# Patient Record
Sex: FEMALE | Race: WHITE | NOT HISPANIC OR LATINO | Employment: OTHER | URBAN - METROPOLITAN AREA
[De-identification: names, ages, dates, MRNs, and addresses within clinical notes are randomized per-mention and may not be internally consistent; named-entity substitution may affect disease eponyms.]

---

## 2023-03-29 LAB
ALANINE AMINOTRANSFERASE (SGPT) (U/L) IN SER/PLAS: 48 U/L (ref 7–45)
ALBUMIN (G/DL) IN SER/PLAS: 4.3 G/DL (ref 3.4–5)
ALKALINE PHOSPHATASE (U/L) IN SER/PLAS: 52 U/L (ref 33–136)
ASPARTATE AMINOTRANSFERASE (SGOT) (U/L) IN SER/PLAS: 30 U/L (ref 9–39)
BILIRUBIN DIRECT (MG/DL) IN SER/PLAS: 0.2 MG/DL (ref 0–0.3)
BILIRUBIN TOTAL (MG/DL) IN SER/PLAS: 0.7 MG/DL (ref 0–1.2)
C REACTIVE PROTEIN (MG/L) IN SER/PLAS: <0.1 MG/DL
ERYTHROCYTE DISTRIBUTION WIDTH (RATIO) BY AUTOMATED COUNT: 14.2 % (ref 11.5–14.5)
ERYTHROCYTE MEAN CORPUSCULAR HEMOGLOBIN CONCENTRATION (G/DL) BY AUTOMATED: 31.8 G/DL (ref 32–36)
ERYTHROCYTE MEAN CORPUSCULAR VOLUME (FL) BY AUTOMATED COUNT: 93 FL (ref 80–100)
ERYTHROCYTES (10*6/UL) IN BLOOD BY AUTOMATED COUNT: 4.6 X10E12/L (ref 4–5.2)
HEMATOCRIT (%) IN BLOOD BY AUTOMATED COUNT: 42.8 % (ref 36–46)
HEMOGLOBIN (G/DL) IN BLOOD: 13.6 G/DL (ref 12–16)
LEUKOCYTES (10*3/UL) IN BLOOD BY AUTOMATED COUNT: 4.8 X10E9/L (ref 4.4–11.3)
NRBC (PER 100 WBCS) BY AUTOMATED COUNT: 0 /100 WBC (ref 0–0)
PLATELETS (10*3/UL) IN BLOOD AUTOMATED COUNT: 241 X10E9/L (ref 150–450)
PROTEIN TOTAL: 6.4 G/DL (ref 6.4–8.2)
SEDIMENTATION RATE, ERYTHROCYTE: <1 MM/H (ref 0–30)

## 2023-04-01 LAB
NIL(NEG) CONTROL SPOT COUNT: NORMAL
PANEL A SPOT COUNT: 0
PANEL B SPOT COUNT: 0
POS CONTROL SPOT COUNT: NORMAL
T-SPOT. TB INTERPRETATION: NEGATIVE

## 2023-04-17 DIAGNOSIS — I10 PRIMARY HYPERTENSION: ICD-10-CM

## 2023-04-17 NOTE — TELEPHONE ENCOUNTER
Rx Refill Request Telephone Encounter    Name:  Penny WARNER Moe  :  [id number]  Medication Name:        Lisinopril-hydrochlorothiazide 20-12.5 MG Oral Tablet 1 tab taken daily    Leflunomide 20M MG Oral Tablet 1 tab taken daily                  Specific Pharmacy location:   [facility]    Date of last appointment:  2023  Date of next appointment:  2023  Best number to reach patient:  [phone]

## 2023-04-19 RX ORDER — LISINOPRIL AND HYDROCHLOROTHIAZIDE 12.5; 2 MG/1; MG/1
1 TABLET ORAL DAILY
COMMUNITY
Start: 2022-11-21 | End: 2023-04-27 | Stop reason: SDUPTHER

## 2023-04-19 RX ORDER — LEFLUNOMIDE 20 MG/1
1 TABLET ORAL DAILY
COMMUNITY
Start: 2022-03-28 | End: 2023-10-19 | Stop reason: SDUPTHER

## 2023-04-19 NOTE — TELEPHONE ENCOUNTER
Rx pended to Dr Yen for lisinopril. The other medication is from rheumatologist. She will need to contact them.

## 2023-04-21 NOTE — TELEPHONE ENCOUNTER
I was given a sticky note that stated the patient was awaiting for me to sign lisinopril, however it appears the medication was pended to dr. Yen, please forward

## 2023-04-21 NOTE — TELEPHONE ENCOUNTER
Patient called regarding refills, Lisinopril will be sent to [facility] today. The other medication the patient has been advised to call the rheumatologist for that refill.

## 2023-04-26 RX ORDER — LISINOPRIL AND HYDROCHLOROTHIAZIDE 12.5; 2 MG/1; MG/1
1 TABLET ORAL DAILY
Qty: 90 TABLET | Refills: 0 | Status: CANCELLED | OUTPATIENT
Start: 2023-04-26 | End: 2024-04-25

## 2023-04-27 ENCOUNTER — OFFICE VISIT (OUTPATIENT)
Dept: PRIMARY CARE | Facility: CLINIC | Age: 68
End: 2023-04-27
Payer: MEDICARE

## 2023-04-27 VITALS
RESPIRATION RATE: 18 BRPM | OXYGEN SATURATION: 95 % | DIASTOLIC BLOOD PRESSURE: 84 MMHG | TEMPERATURE: 96.1 F | SYSTOLIC BLOOD PRESSURE: 146 MMHG | WEIGHT: 264.2 LBS | HEIGHT: 62 IN | HEART RATE: 67 BPM | BODY MASS INDEX: 48.62 KG/M2

## 2023-04-27 DIAGNOSIS — M05.79 RHEUMATOID ARTHRITIS INVOLVING MULTIPLE SITES WITH POSITIVE RHEUMATOID FACTOR (MULTI): ICD-10-CM

## 2023-04-27 DIAGNOSIS — I10 ESSENTIAL HYPERTENSION: ICD-10-CM

## 2023-04-27 DIAGNOSIS — Z13.21 ENCOUNTER FOR VITAMIN DEFICIENCY SCREENING: ICD-10-CM

## 2023-04-27 DIAGNOSIS — E78.2 MIXED HYPERLIPIDEMIA: ICD-10-CM

## 2023-04-27 DIAGNOSIS — Z13.29 THYROID DISORDER SCREENING: ICD-10-CM

## 2023-04-27 DIAGNOSIS — E83.52 HYPERCALCEMIA: Primary | ICD-10-CM

## 2023-04-27 DIAGNOSIS — E21.0 PRIMARY HYPERPARATHYROIDISM (MULTI): ICD-10-CM

## 2023-04-27 PROBLEM — M19.022 ARTHRITIS OF LEFT ELBOW: Status: ACTIVE | Noted: 2023-04-27

## 2023-04-27 PROBLEM — S89.92XA INJURY OF LEFT KNEE: Status: ACTIVE | Noted: 2023-04-27

## 2023-04-27 PROBLEM — D36.9 ADENOMATOUS POLYPS: Status: ACTIVE | Noted: 2023-04-27

## 2023-04-27 PROBLEM — D12.5 ADENOMATOUS POLYP OF SIGMOID COLON: Status: ACTIVE | Noted: 2023-04-27

## 2023-04-27 PROBLEM — D12.6 TUBULAR ADENOMA OF COLON: Status: ACTIVE | Noted: 2023-04-27

## 2023-04-27 PROBLEM — M54.50 LUMBAGO: Status: ACTIVE | Noted: 2023-04-27

## 2023-04-27 PROBLEM — R35.1 NOCTURIA: Status: ACTIVE | Noted: 2023-04-27

## 2023-04-27 PROBLEM — S86.912A STRAIN OF LEFT KNEE AND LEG: Status: ACTIVE | Noted: 2023-04-27

## 2023-04-27 PROBLEM — S99.921A INJURY OF TOE ON RIGHT FOOT: Status: ACTIVE | Noted: 2023-04-27

## 2023-04-27 PROBLEM — N32.81 OVERACTIVE BLADDER: Status: ACTIVE | Noted: 2023-04-27

## 2023-04-27 PROBLEM — S96.911A RIGHT FOOT STRAIN, INITIAL ENCOUNTER: Status: ACTIVE | Noted: 2023-04-27

## 2023-04-27 PROBLEM — J20.9 ACUTE BRONCHITIS: Status: ACTIVE | Noted: 2023-04-27

## 2023-04-27 PROBLEM — J02.9 SORE THROAT: Status: ACTIVE | Noted: 2023-04-27

## 2023-04-27 PROBLEM — Z96.611 STATUS POST TOTAL REPLACEMENT OF RIGHT SHOULDER: Status: ACTIVE | Noted: 2023-04-27

## 2023-04-27 PROBLEM — D17.23 LIPOMA OF RIGHT LOWER EXTREMITY: Status: ACTIVE | Noted: 2023-04-27

## 2023-04-27 PROBLEM — M19.019 SHOULDER ARTHRITIS: Status: ACTIVE | Noted: 2023-04-27

## 2023-04-27 PROBLEM — M25.473 ANKLE SWELLING: Status: ACTIVE | Noted: 2023-04-27

## 2023-04-27 PROBLEM — M85.89 OSTEOPENIA OF MULTIPLE SITES: Status: ACTIVE | Noted: 2023-04-27

## 2023-04-27 PROBLEM — K52.9 CHRONIC DIARRHEA: Status: ACTIVE | Noted: 2023-04-27

## 2023-04-27 PROBLEM — N31.8 FREQUENCY-URGENCY SYNDROME: Status: ACTIVE | Noted: 2023-04-27

## 2023-04-27 PROBLEM — B37.2 YEAST INFECTION OF THE SKIN: Status: ACTIVE | Noted: 2023-04-27

## 2023-04-27 PROBLEM — F32.A DEPRESSION: Status: ACTIVE | Noted: 2023-04-27

## 2023-04-27 PROBLEM — F33.42 RECURRENT MAJOR DEPRESSIVE DISORDER, IN FULL REMISSION (CMS-HCC): Status: ACTIVE | Noted: 2023-04-27

## 2023-04-27 PROBLEM — M25.511 RIGHT SHOULDER PAIN: Status: ACTIVE | Noted: 2023-04-27

## 2023-04-27 PROBLEM — M25.811 SHOULDER IMPINGEMENT, RIGHT: Status: ACTIVE | Noted: 2023-04-27

## 2023-04-27 PROBLEM — J03.90 TONSILLITIS: Status: ACTIVE | Noted: 2023-04-27

## 2023-04-27 PROBLEM — M06.9 RHEUMATOID ARTHRITIS (MULTI): Status: ACTIVE | Noted: 2023-04-27

## 2023-04-27 PROCEDURE — 1159F MED LIST DOCD IN RCRD: CPT | Performed by: NURSE PRACTITIONER

## 2023-04-27 PROCEDURE — 90750 HZV VACC RECOMBINANT IM: CPT | Performed by: NURSE PRACTITIONER

## 2023-04-27 PROCEDURE — 3077F SYST BP >= 140 MM HG: CPT | Performed by: NURSE PRACTITIONER

## 2023-04-27 PROCEDURE — 99214 OFFICE O/P EST MOD 30 MIN: CPT | Performed by: NURSE PRACTITIONER

## 2023-04-27 PROCEDURE — 3079F DIAST BP 80-89 MM HG: CPT | Performed by: NURSE PRACTITIONER

## 2023-04-27 PROCEDURE — 90471 IMMUNIZATION ADMIN: CPT | Performed by: NURSE PRACTITIONER

## 2023-04-27 PROCEDURE — 3008F BODY MASS INDEX DOCD: CPT | Performed by: NURSE PRACTITIONER

## 2023-04-27 PROCEDURE — 1036F TOBACCO NON-USER: CPT | Performed by: NURSE PRACTITIONER

## 2023-04-27 RX ORDER — METOPROLOL TARTRATE 25 MG/1
1 TABLET, FILM COATED ORAL DAILY
COMMUNITY
Start: 2023-02-20 | End: 2024-03-08 | Stop reason: SDUPTHER

## 2023-04-27 RX ORDER — LISINOPRIL AND HYDROCHLOROTHIAZIDE 12.5; 2 MG/1; MG/1
1 TABLET ORAL DAILY
Qty: 90 TABLET | Refills: 3 | Status: SHIPPED | OUTPATIENT
Start: 2023-04-27 | End: 2024-01-05 | Stop reason: SDUPTHER

## 2023-04-27 RX ORDER — HYDROCHLOROTHIAZIDE 12.5 MG/1
1 TABLET ORAL DAILY
COMMUNITY
Start: 2022-10-03 | End: 2023-04-27

## 2023-04-27 RX ORDER — ROSUVASTATIN CALCIUM 10 MG/1
1 TABLET, COATED ORAL NIGHTLY
COMMUNITY
Start: 2023-02-20 | End: 2024-01-05 | Stop reason: SDUPTHER

## 2023-04-27 RX ORDER — CITALOPRAM 20 MG/1
20 TABLET, FILM COATED ORAL DAILY
COMMUNITY
End: 2024-05-24 | Stop reason: SDUPTHER

## 2023-04-27 ASSESSMENT — ENCOUNTER SYMPTOMS
CONFUSION: 0
ABDOMINAL PAIN: 0
PALPITATIONS: 0
HEADACHES: 0
CONSTITUTIONAL NEGATIVE: 1
RESPIRATORY NEGATIVE: 1
NERVOUS/ANXIOUS: 0
ACTIVITY CHANGE: 0
DIZZINESS: 0
VOMITING: 0
APNEA: 0
WEAKNESS: 0
NAUSEA: 0
FEVER: 0

## 2023-04-27 NOTE — PROGRESS NOTES
"Subjective   Patient ID: Penny Rosa is a 68 y.o. female who presents for Follow-up (8 week follow up, 2nd Shingrix ).    Patient here for 2nd shingrix vaccine.   Patient needs refill on lisinopril/hydrochlorothiazide. Also takes metoprolol. BP stable   No issues or concerns            Review of Systems   Constitutional: Negative.  Negative for activity change and fever.   Respiratory: Negative.  Negative for apnea.    Cardiovascular:  Negative for chest pain and palpitations.   Gastrointestinal:  Negative for abdominal pain, nausea and vomiting.   Neurological:  Negative for dizziness, weakness and headaches.   Psychiatric/Behavioral:  Negative for confusion. The patient is not nervous/anxious.        Objective   /84   Pulse 67   Temp 35.6 °C (96.1 °F)   Resp 18   Ht 1.575 m (5' 2\")   Wt 120 kg (264 lb 3.2 oz)   SpO2 95%   BMI 48.32 kg/m²     Physical Exam  Constitutional:       Appearance: Normal appearance.   HENT:      Head: Normocephalic.   Cardiovascular:      Rate and Rhythm: Normal rate and regular rhythm.      Pulses: Normal pulses.      Heart sounds: Normal heart sounds.   Pulmonary:      Effort: Pulmonary effort is normal.      Breath sounds: Normal breath sounds.   Abdominal:      General: Bowel sounds are normal.   Neurological:      General: No focal deficit present.      Mental Status: She is alert.   Psychiatric:         Mood and Affect: Mood normal.         Behavior: Behavior normal.         Assessment/Plan   Problem List Items Addressed This Visit          Circulatory    Essential hypertension    Relevant Medications    lisinopriL-hydrochlorothiazide 20-12.5 mg tablet    Other Relevant Orders    Albumin , Urine Random    Comprehensive Metabolic Panel    CBC    Lipid Panel    Vitamin B12    TSH with reflex to Free T4 if abnormal    Vitamin D 1,25 Dihydroxy       Endocrine/Metabolic    Primary hyperparathyroidism (CMS/HCC)    Relevant Orders    Albumin , Urine Random    " Comprehensive Metabolic Panel    CBC    Lipid Panel    Vitamin B12    TSH with reflex to Free T4 if abnormal    Vitamin D 1,25 Dihydroxy       Other    Hypercalcemia - Primary    Relevant Orders    Albumin , Urine Random    Comprehensive Metabolic Panel    CBC    Lipid Panel    Vitamin B12    TSH with reflex to Free T4 if abnormal    Vitamin D 1,25 Dihydroxy    Mixed hyperlipidemia    Relevant Orders    Albumin , Urine Random    Comprehensive Metabolic Panel    CBC    Lipid Panel    Vitamin B12    TSH with reflex to Free T4 if abnormal    Vitamin D 1,25 Dihydroxy    Rheumatoid arthritis (CMS/HCC)    Relevant Orders    Albumin , Urine Random    Comprehensive Metabolic Panel    CBC    Lipid Panel    Vitamin B12    TSH with reflex to Free T4 if abnormal    Vitamin D 1,25 Dihydroxy     Other Visit Diagnoses       Encounter for vitamin deficiency screening        Relevant Orders    Vitamin B12    Vitamin D 1,25 Dihydroxy    Thyroid disorder screening        Relevant Orders    TSH with reflex to Free T4 if abnormal

## 2023-06-13 LAB
ALANINE AMINOTRANSFERASE (SGPT) (U/L) IN SER/PLAS: 27 U/L (ref 7–45)
ALBUMIN (G/DL) IN SER/PLAS: 4.1 G/DL (ref 3.4–5)
ALKALINE PHOSPHATASE (U/L) IN SER/PLAS: 45 U/L (ref 33–136)
ANION GAP IN SER/PLAS: 10 MMOL/L (ref 10–20)
ASPARTATE AMINOTRANSFERASE (SGOT) (U/L) IN SER/PLAS: 21 U/L (ref 9–39)
BILIRUBIN TOTAL (MG/DL) IN SER/PLAS: 0.6 MG/DL (ref 0–1.2)
CALCIDIOL (25 OH VITAMIN D3) (NG/ML) IN SER/PLAS: 35 NG/ML
CALCIUM (MG/DL) IN SER/PLAS: 9.2 MG/DL (ref 8.6–10.3)
CARBON DIOXIDE, TOTAL (MMOL/L) IN SER/PLAS: 25 MMOL/L (ref 21–32)
CHLORIDE (MMOL/L) IN SER/PLAS: 110 MMOL/L (ref 98–107)
CHOLESTEROL (MG/DL) IN SER/PLAS: 148 MG/DL (ref 0–199)
CHOLESTEROL IN HDL (MG/DL) IN SER/PLAS: 71.3 MG/DL
CHOLESTEROL IN LDL (MG/DL) IN SER/PLAS BY DIRECT ASSAY: 40 MG/DL (ref 0–129)
CHOLESTEROL/HDL RATIO: 2.1
COBALAMIN (VITAMIN B12) (PG/ML) IN SER/PLAS: 1109 PG/ML (ref 211–911)
CREATININE (MG/DL) IN SER/PLAS: 0.73 MG/DL (ref 0.5–1.05)
ERYTHROCYTE DISTRIBUTION WIDTH (RATIO) BY AUTOMATED COUNT: 14.1 % (ref 11.5–14.5)
ERYTHROCYTE MEAN CORPUSCULAR HEMOGLOBIN CONCENTRATION (G/DL) BY AUTOMATED: 31.5 G/DL (ref 32–36)
ERYTHROCYTE MEAN CORPUSCULAR VOLUME (FL) BY AUTOMATED COUNT: 93 FL (ref 80–100)
ERYTHROCYTES (10*6/UL) IN BLOOD BY AUTOMATED COUNT: 4.67 X10E12/L (ref 4–5.2)
GFR FEMALE: 90 ML/MIN/1.73M2
GLUCOSE (MG/DL) IN SER/PLAS: 103 MG/DL (ref 74–99)
HEMATOCRIT (%) IN BLOOD BY AUTOMATED COUNT: 43.2 % (ref 36–46)
HEMOGLOBIN (G/DL) IN BLOOD: 13.6 G/DL (ref 12–16)
LDL: 58 MG/DL (ref 0–99)
LEUKOCYTES (10*3/UL) IN BLOOD BY AUTOMATED COUNT: 4.6 X10E9/L (ref 4.4–11.3)
PLATELETS (10*3/UL) IN BLOOD AUTOMATED COUNT: 192 X10E9/L (ref 150–450)
POTASSIUM (MMOL/L) IN SER/PLAS: 3.9 MMOL/L (ref 3.5–5.3)
PROTEIN TOTAL: 6.4 G/DL (ref 6.4–8.2)
SODIUM (MMOL/L) IN SER/PLAS: 141 MMOL/L (ref 136–145)
THYROTROPIN (MIU/L) IN SER/PLAS BY DETECTION LIMIT <= 0.05 MIU/L: 0.84 MIU/L (ref 0.44–3.98)
TRIGLYCERIDE (MG/DL) IN SER/PLAS: 93 MG/DL (ref 0–149)
UREA NITROGEN (MG/DL) IN SER/PLAS: 17 MG/DL (ref 6–23)
VLDL: 19 MG/DL (ref 0–40)

## 2023-06-29 ENCOUNTER — APPOINTMENT (OUTPATIENT)
Dept: PRIMARY CARE | Facility: CLINIC | Age: 68
End: 2023-06-29
Payer: MEDICARE

## 2023-06-30 ENCOUNTER — OFFICE VISIT (OUTPATIENT)
Dept: PRIMARY CARE | Facility: CLINIC | Age: 68
End: 2023-06-30
Payer: MEDICARE

## 2023-06-30 VITALS
RESPIRATION RATE: 14 BRPM | DIASTOLIC BLOOD PRESSURE: 79 MMHG | HEIGHT: 62 IN | WEIGHT: 257 LBS | SYSTOLIC BLOOD PRESSURE: 153 MMHG | TEMPERATURE: 97.2 F | BODY MASS INDEX: 47.29 KG/M2 | HEART RATE: 59 BPM | OXYGEN SATURATION: 96 %

## 2023-06-30 DIAGNOSIS — H61.23 BILATERAL HEARING LOSS DUE TO CERUMEN IMPACTION: ICD-10-CM

## 2023-06-30 DIAGNOSIS — E55.9 VITAMIN D DEFICIENCY: ICD-10-CM

## 2023-06-30 DIAGNOSIS — R73.01 IMPAIRED FASTING BLOOD SUGAR: ICD-10-CM

## 2023-06-30 DIAGNOSIS — E53.8 B12 DEFICIENCY: ICD-10-CM

## 2023-06-30 DIAGNOSIS — I10 ESSENTIAL HYPERTENSION: ICD-10-CM

## 2023-06-30 DIAGNOSIS — E78.2 MIXED HYPERLIPIDEMIA: Primary | ICD-10-CM

## 2023-06-30 PROCEDURE — 3077F SYST BP >= 140 MM HG: CPT | Performed by: NURSE PRACTITIONER

## 2023-06-30 PROCEDURE — 1036F TOBACCO NON-USER: CPT | Performed by: NURSE PRACTITIONER

## 2023-06-30 PROCEDURE — 3008F BODY MASS INDEX DOCD: CPT | Performed by: NURSE PRACTITIONER

## 2023-06-30 PROCEDURE — 99214 OFFICE O/P EST MOD 30 MIN: CPT | Performed by: NURSE PRACTITIONER

## 2023-06-30 PROCEDURE — 69209 REMOVE IMPACTED EAR WAX UNI: CPT | Performed by: NURSE PRACTITIONER

## 2023-06-30 PROCEDURE — 1159F MED LIST DOCD IN RCRD: CPT | Performed by: NURSE PRACTITIONER

## 2023-06-30 PROCEDURE — 3078F DIAST BP <80 MM HG: CPT | Performed by: NURSE PRACTITIONER

## 2023-06-30 RX ORDER — NAPROXEN SODIUM 220 MG
220 TABLET ORAL 2 TIMES DAILY
COMMUNITY
Start: 2012-09-28

## 2023-06-30 RX ORDER — PREDNISONE 5 MG/1
1 TABLET ORAL DAILY
COMMUNITY
End: 2024-01-05 | Stop reason: WASHOUT

## 2023-06-30 RX ORDER — MELATON/THEAN/VAL/LEM/CHAM/LAV 10MG-200MG
1 TABLET,IMMED, EXTENDED RELEASE, BIPHASIC ORAL DAILY
COMMUNITY
End: 2024-05-24 | Stop reason: ALTCHOICE

## 2023-06-30 RX ORDER — TOCILIZUMAB 20 MG/ML
INJECTION, SOLUTION, CONCENTRATE INTRAVENOUS
COMMUNITY
Start: 2022-04-08 | End: 2024-01-05 | Stop reason: WASHOUT

## 2023-06-30 ASSESSMENT — ENCOUNTER SYMPTOMS
HEADACHES: 0
NAUSEA: 0
SPEECH DIFFICULTY: 0
PALPITATIONS: 0
CONFUSION: 0
WEAKNESS: 0
COUGH: 0
ACTIVITY CHANGE: 0
ARTHRALGIAS: 0
CHILLS: 0
FEVER: 0
ABDOMINAL PAIN: 0
SLEEP DISTURBANCE: 0
VOMITING: 0
CONSTITUTIONAL NEGATIVE: 1
DIZZINESS: 0
SORE THROAT: 0
NERVOUS/ANXIOUS: 0
APNEA: 0
MYALGIAS: 0
SHORTNESS OF BREATH: 0

## 2023-06-30 NOTE — ASSESSMENT & PLAN NOTE
Continue working on diet/exercise plan   Low carbs and low sugar intake   Follow up with labs 6 months

## 2023-06-30 NOTE — PROGRESS NOTES
"Subjective   Patient ID: Penny Rosa is a 68 y.o. female who presents for Follow-up (4 month).    Ear muffling and \"clogging\"  Denies pain, drainage or fever.   Bilateral ears     Hypertension: Patient with slightly elevated bp   Takes lisionpril-hydrochlorothiazide   DUSTY diet   Asymptomatic    Lab review     .Lab Results       Component                Value               Date                       CHOL                     148                 06/13/2023                 CHOL                     143                 05/31/2023                 CHOL                     135                 05/02/2023            Lab Results       Component                Value               Date                       HDL                      71.3                06/13/2023                 HDL                      78.4                05/31/2023                 HDL                      76.8                05/02/2023            No results found for: \"LDLCALC\"  Lab Results       Component                Value               Date                       TRIG                     93                  06/13/2023                 TRIG                     111                 05/31/2023                 TRIG                     92                  05/02/2023            No components found for: \"CHOLHDL\"   .  Chemistry   Lab Results       Component                Value               Date/Time                  NA                       139                 06/27/2023 0914            K                        4.3                 06/27/2023 0914            CL                       106                 06/27/2023 0914            CO2                      28                  06/27/2023 0914            BUN                      15                  06/27/2023 0914            CREATININE               0.82                06/27/2023 0914      Lab Results       Component                Value               Date/Time                  CALCIUM                  9.0      "            06/27/2023 0914            ALKPHOS                  46                  06/27/2023 0914            AST                      20                  06/27/2023 0914            ALT                      25                  06/27/2023 0914            BILITOT                  0.6                 06/27/2023 0914       .Lab Results       Component                Value               Date                       WBC                      5.6                 06/27/2023                 HGB                      13.5                06/27/2023                 HCT                      41.9                06/27/2023                 MCV                      93                  06/27/2023                 PLT                      236                 06/27/2023               .  Current Outpatient Medications:   ·  naproxen sodium (Aleve) 220 mg tablet, Take 1 tablet (220 mg) by mouth twice a day., Disp: , Rfl:   ·  tocilizumab (Actemra) 80 mg/4 mL (20 mg/mL) solution, Infuse into a venous catheter every 28 (twenty-eight) days., Disp: , Rfl:   ·  VITAMIN B COMPLEX ORAL, Take 1 tablet by mouth once daily., Disp: , Rfl:   ·  citalopram (CeleXA) 20 mg tablet, Take 1 tablet (20 mg) by mouth once daily., Disp: , Rfl:   ·  folic acid/vit B complex and C (B complex-vitamin C-folic acid) 400 mcg tablet extended release, Take 1 tablet by mouth once daily., Disp: , Rfl:   ·  leflunomide (Arava) 20 mg tablet, Take 1 tablet (20 mg) by mouth once daily., Disp: , Rfl:   ·  lisinopriL-hydrochlorothiazide 20-12.5 mg tablet, Take 1 tablet by mouth once daily., Disp: 90 tablet, Rfl: 3  ·  metoprolol tartrate (Lopressor) 25 mg tablet, Take 1 tablet (25 mg) by mouth once daily., Disp: , Rfl:   ·  predniSONE (Deltasone) 5 mg tablet, Take 1 tablet (5 mg) by mouth once daily., Disp: , Rfl:   ·  rosuvastatin (Crestor) 10 mg tablet, Take 1 tablet (10 mg) by mouth once daily at bedtime., Disp: , Rfl:           Review of Systems   Constitutional: Negative.   "Negative for activity change, chills and fever.   HENT:  Positive for hearing loss. Negative for congestion, ear discharge, ear pain, postnasal drip, sneezing, sore throat and tinnitus.    Respiratory:  Negative for apnea, cough and shortness of breath.    Cardiovascular:  Negative for chest pain and palpitations.   Gastrointestinal:  Negative for abdominal pain, nausea and vomiting.   Musculoskeletal:  Negative for arthralgias and myalgias.   Neurological:  Negative for dizziness, syncope, speech difficulty, weakness and headaches.   Psychiatric/Behavioral:  Negative for confusion and sleep disturbance. The patient is not nervous/anxious.        Objective   /79   Pulse 59   Temp 36.2 °C (97.2 °F)   Resp 14   Ht 1.575 m (5' 2\")   Wt 117 kg (257 lb)   SpO2 96%   BMI 47.01 kg/m²     Physical Exam  Vitals reviewed.   Constitutional:       Appearance: Normal appearance.   HENT:      Head: Normocephalic.      Right Ear: There is impacted cerumen.      Left Ear: There is impacted cerumen.   Eyes:      Conjunctiva/sclera: Conjunctivae normal.   Cardiovascular:      Rate and Rhythm: Normal rate and regular rhythm.      Pulses: Normal pulses.      Heart sounds: Normal heart sounds.   Pulmonary:      Effort: Pulmonary effort is normal.      Breath sounds: Normal breath sounds. No wheezing.   Abdominal:      General: Bowel sounds are normal. There is no distension.   Skin:     General: Skin is warm and dry.   Neurological:      General: No focal deficit present.      Mental Status: She is alert and oriented to person, place, and time.   Psychiatric:         Mood and Affect: Mood normal.         Behavior: Behavior normal.       Patient ID: Penny Rosa is a 68 y.o. female.    Ear Cerumen Removal    Date/Time: 6/30/2023 9:42 AM    Performed by: ZAIDA Levine  Authorized by: ZAIDA Levine    Consent:     Consent obtained:  Verbal    Consent given by:  Patient    Risks discussed:  Bleeding " and dizziness    Alternatives discussed:  Alternative treatment  Universal protocol:     Procedure explained and questions answered to patient or proxy's satisfaction: yes      Patient identity confirmed:  Verbally with patient  Procedure details:     Location:  L ear and R ear    Procedure type: irrigation      Procedure outcomes: cerumen removed    Post-procedure details:     Inspection:  No bleeding    Hearing quality:  Improved    Procedure completion:  Tolerated well, no immediate complications    Assessment/Plan   Problem List Items Addressed This Visit       Essential hypertension     .Well-controlled  Continue current plan of care  Follow-up in 6 months with labs          Relevant Orders    CBC    Comprehensive Metabolic Panel    Vitamin D 1,25 Dihydroxy    TSH with reflex to Free T4 if abnormal    Albumin , Urine Random    Mixed hyperlipidemia - Primary     .Well-controlled  Continue current plan of care  Follow-up in 6 months with labs          Relevant Orders    CBC    Comprehensive Metabolic Panel    Lipid Panel    Bilateral hearing loss due to cerumen impaction     Ear irrigation done in office   Use otc debrox for management          Relevant Orders    Ear Cerumen Removal    B12 deficiency     B12 level high  Take eod          Relevant Orders    Vitamin B12    Impaired fasting blood sugar     Continue working on diet/exercise plan   Low carbs and low sugar intake   Follow up with labs 6 months         Relevant Orders    CBC    Hemoglobin A1C    Vitamin D deficiency    Relevant Orders    Vitamin D 1,25 Dihydroxy

## 2023-10-19 ENCOUNTER — TELEPHONE (OUTPATIENT)
Dept: RHEUMATOLOGY | Facility: CLINIC | Age: 68
End: 2023-10-19
Payer: MEDICARE

## 2023-10-19 DIAGNOSIS — M06.00 RHEUMATOID ARTHRITIS WITH NEGATIVE RHEUMATOID FACTOR, INVOLVING UNSPECIFIED SITE (MULTI): Primary | ICD-10-CM

## 2023-10-19 RX ORDER — LEFLUNOMIDE 20 MG/1
20 TABLET ORAL DAILY
Qty: 30 TABLET | Refills: 1 | Status: SHIPPED | OUTPATIENT
Start: 2023-10-19 | End: 2023-12-22

## 2023-10-19 NOTE — TELEPHONE ENCOUNTER
Patient called for a refill on     Leflunomide 20mg #90 take one tablet by mouth daily    [facility] [place]

## 2023-10-20 DIAGNOSIS — M05.79 RHEUMATOID ARTHRITIS INVOLVING MULTIPLE SITES WITH POSITIVE RHEUMATOID FACTOR (MULTI): Primary | ICD-10-CM

## 2023-10-22 PROBLEM — E66.01 MORBID OBESITY WITH BMI OF 40.0-44.9, ADULT (MULTI): Status: ACTIVE | Noted: 2023-10-22

## 2023-10-22 PROBLEM — N39.41 URGE INCONTINENCE: Status: ACTIVE | Noted: 2023-10-22

## 2023-10-22 PROBLEM — Z90.710 H/O: HYSTERECTOMY: Status: ACTIVE | Noted: 2023-10-22

## 2023-10-22 PROBLEM — C54.1 ENDOMETRIAL CANCER (MULTI): Status: ACTIVE | Noted: 2023-10-22

## 2023-10-24 ENCOUNTER — LAB (OUTPATIENT)
Dept: LAB | Facility: HOSPITAL | Age: 68
End: 2023-10-24
Payer: MEDICARE

## 2023-10-24 DIAGNOSIS — M05.79 RHEUMATOID ARTHRITIS INVOLVING MULTIPLE SITES WITH POSITIVE RHEUMATOID FACTOR (MULTI): ICD-10-CM

## 2023-10-24 LAB
CRP SERPL-MCNC: <0.1 MG/DL
ERYTHROCYTE [SEDIMENTATION RATE] IN BLOOD BY WESTERGREN METHOD: <1 MM/H (ref 0–30)

## 2023-10-24 PROCEDURE — 36415 COLL VENOUS BLD VENIPUNCTURE: CPT

## 2023-10-24 PROCEDURE — 85652 RBC SED RATE AUTOMATED: CPT

## 2023-10-24 PROCEDURE — 86140 C-REACTIVE PROTEIN: CPT

## 2023-10-25 DIAGNOSIS — M05.79 RHEUMATOID ARTHRITIS INVOLVING MULTIPLE SITES WITH POSITIVE RHEUMATOID FACTOR (MULTI): Primary | ICD-10-CM

## 2023-10-25 RX ORDER — DIPHENHYDRAMINE HYDROCHLORIDE 50 MG/ML
50 INJECTION INTRAMUSCULAR; INTRAVENOUS AS NEEDED
Status: CANCELLED | OUTPATIENT
Start: 2023-10-26

## 2023-10-25 RX ORDER — HEPARIN SODIUM,PORCINE/PF 10 UNIT/ML
50 SYRINGE (ML) INTRAVENOUS AS NEEDED
OUTPATIENT
Start: 2023-10-26

## 2023-10-25 RX ORDER — FAMOTIDINE 10 MG/ML
20 INJECTION INTRAVENOUS ONCE AS NEEDED
Status: CANCELLED | OUTPATIENT
Start: 2023-10-26

## 2023-10-25 RX ORDER — ALBUTEROL SULFATE 0.83 MG/ML
3 SOLUTION RESPIRATORY (INHALATION) AS NEEDED
Status: CANCELLED | OUTPATIENT
Start: 2023-10-26

## 2023-10-25 RX ORDER — EPINEPHRINE 0.3 MG/.3ML
0.3 INJECTION SUBCUTANEOUS EVERY 5 MIN PRN
Status: CANCELLED | OUTPATIENT
Start: 2023-10-26

## 2023-10-26 ENCOUNTER — INFUSION (OUTPATIENT)
Dept: HEMATOLOGY/ONCOLOGY | Facility: CLINIC | Age: 68
End: 2023-10-26
Payer: MEDICARE

## 2023-10-26 VITALS
OXYGEN SATURATION: 94 % | DIASTOLIC BLOOD PRESSURE: 85 MMHG | HEIGHT: 63 IN | TEMPERATURE: 97.2 F | WEIGHT: 259.26 LBS | BODY MASS INDEX: 45.94 KG/M2 | RESPIRATION RATE: 16 BRPM | HEART RATE: 60 BPM | SYSTOLIC BLOOD PRESSURE: 143 MMHG

## 2023-10-26 ASSESSMENT — COLUMBIA-SUICIDE SEVERITY RATING SCALE - C-SSRS
6. HAVE YOU EVER DONE ANYTHING, STARTED TO DO ANYTHING, OR PREPARED TO DO ANYTHING TO END YOUR LIFE?: NO
1. IN THE PAST MONTH, HAVE YOU WISHED YOU WERE DEAD OR WISHED YOU COULD GO TO SLEEP AND NOT WAKE UP?: NO
2. HAVE YOU ACTUALLY HAD ANY THOUGHTS OF KILLING YOURSELF?: NO

## 2023-10-26 ASSESSMENT — ENCOUNTER SYMPTOMS
DEPRESSION: 0
LOSS OF SENSATION IN FEET: 0
OCCASIONAL FEELINGS OF UNSTEADINESS: 0

## 2023-10-26 ASSESSMENT — PAIN SCALES - GENERAL: PAINLEVEL: 0-NO PAIN

## 2023-10-26 ASSESSMENT — PATIENT HEALTH QUESTIONNAIRE - PHQ9
SUM OF ALL RESPONSES TO PHQ9 QUESTIONS 1 AND 2: 0
1. LITTLE INTEREST OR PLEASURE IN DOING THINGS: NOT AT ALL
2. FEELING DOWN, DEPRESSED OR HOPELESS: NOT AT ALL

## 2023-10-26 NOTE — PROGRESS NOTES
Patient needed labs ordered for today's treatment. RN unable to contact provider after multiple attempts. Patient did not want to wait anymore. She has FUV with provider next week. Patient verbalized understanding of all. No further needs.     No labs needed for each infusion.

## 2023-10-27 ENCOUNTER — TELEPHONE (OUTPATIENT)
Dept: PRIMARY CARE | Facility: CLINIC | Age: 68
End: 2023-10-27

## 2023-10-27 NOTE — TELEPHONE ENCOUNTER
Patient called in today stating that she will need new orders sent to [facility] for Tocilizumab infusions she has an appointment 11/6/2023 please advise

## 2023-11-06 ENCOUNTER — OFFICE VISIT (OUTPATIENT)
Dept: RHEUMATOLOGY | Facility: CLINIC | Age: 68
End: 2023-11-06
Payer: MEDICARE

## 2023-11-06 VITALS — BODY MASS INDEX: 46.18 KG/M2 | WEIGHT: 260 LBS

## 2023-11-06 DIAGNOSIS — M05.79 RHEUMATOID ARTHRITIS INVOLVING MULTIPLE SITES WITH POSITIVE RHEUMATOID FACTOR (MULTI): Primary | ICD-10-CM

## 2023-11-06 PROCEDURE — 1159F MED LIST DOCD IN RCRD: CPT | Performed by: INTERNAL MEDICINE

## 2023-11-06 PROCEDURE — 1126F AMNT PAIN NOTED NONE PRSNT: CPT | Performed by: INTERNAL MEDICINE

## 2023-11-06 PROCEDURE — 1036F TOBACCO NON-USER: CPT | Performed by: INTERNAL MEDICINE

## 2023-11-06 PROCEDURE — 3077F SYST BP >= 140 MM HG: CPT | Performed by: INTERNAL MEDICINE

## 2023-11-06 PROCEDURE — 3008F BODY MASS INDEX DOCD: CPT | Performed by: INTERNAL MEDICINE

## 2023-11-06 PROCEDURE — 99213 OFFICE O/P EST LOW 20 MIN: CPT | Performed by: INTERNAL MEDICINE

## 2023-11-06 PROCEDURE — 3078F DIAST BP <80 MM HG: CPT | Performed by: INTERNAL MEDICINE

## 2023-11-06 RX ORDER — SULFASALAZINE 500 MG/1
500 TABLET ORAL 2 TIMES DAILY
Qty: 60 TABLET | Refills: 0 | Status: SHIPPED | OUTPATIENT
Start: 2023-11-06 | End: 2024-01-05 | Stop reason: WASHOUT

## 2023-11-06 NOTE — PATIENT INSTRUCTIONS
Begin sulfasalazine 1 pill twice a day.  Continue leflunomide once daily.  Call me if any question.  Follow-up in 3 months.

## 2023-11-06 NOTE — PROGRESS NOTES
"Subjective   Patient ID: Penny Rosa is a 68 y.o. female who presents for Follow-up.    HPI.   68-year-old female with history of seropositive RA, OA s/p bilateral TKA and right CRISTHIAN, s/p right shoulder surgery, osteopenia, endometrial CA s/p BENJAMIN/BSO (2017), HTN and obesity presented for follow-up.     She has chronic deformities.  She has chronic persistent pain however that gets worse at times.  She takes prednisone.  She received last Orencia infusion in September.  She is reports that she has to pay dollar 600 copayment for each Orencia infusion beginning from August 31, 2023.  She is planning controlled with different insurance.    Immunosuppression: Immunosuppression: Leflunomide (8/2021) and Actemra (7/2022).     Past immunosuppressive therapy:  1. MTX.  2. Humira.  3. Enbrel  4. Actemra. 7/2019- 8/2021. Resumed in July 2022.   5. Prednisone.    Review of Systems   All other systems reviewed and are negative.    Objective .      3/29/2023    11:07 AM 4/27/2023     9:49 AM 6/8/2023    10:04 AM 6/29/2023     8:31 AM 6/30/2023     9:05 AM 10/26/2023     8:42 AM 11/6/2023     3:25 PM   Vitals   Systolic 175 146 155 143 153 143    Diastolic 103 84 107 86 79 85    Heart Rate 59 67 57 67 59 60    Temp  35.6 °C (96.1 °F)  36.6 °C (97.9 °F) 36.2 °C (97.2 °F) 36.2 °C (97.2 °F)    Resp 17 18  16 14 16    Height (in)  1.575 m (5' 2\") 1.626 m (5' 4\") 1.589 m (5' 2.56\") 1.575 m (5' 2\") 1.598 m (5' 2.91\")     Weight (lb) 260 264.2 257.44 257.5 257 259.26 260   BMI 46.71 kg/m2 48.32 kg/m2 44.19 kg/m2 46.26 kg/m2 47.01 kg/m2 46.05 kg/m2 46.18 kg/m2   BSA (m2) 2.28 m2 2.29 m2 2.3 m2 2.27 m2 2.26 m2 2.29 m2 2.29 m2   Visit Report  Report   Report  Report       Significant value      Physical Exam.  Gen. AAO x3, NAD.  HEENT: No pallor or icterus, PERRLA, EOMI. Parotid glands  not enlarged. No cervical lymphadenopathy .  Skin: No rashes.  Heart: S1, S2/ RRR.   Lungs: CTA B.  Abdomen: Soft, NT/ND, BS regular.  MSK: " Chronic synovial proliferation of the MCP joint with decreased range of motion and possible subluxation of the MCP joints of the right hand.  She has bilateral swan-neck and boutonniere deformities.  Bilateral wrist with severely decreased range of motion.  Bilateral elbow without swelling and tenderness.  Bilateral knee without swelling and tenderness.  Bilateral ankle and MTP without swelling and tenderness.    Neuro: Sensation to touch intact.Strength 5/5 throughout.   Psych:Appropriate mood and behavior  EXT: No edema    Assessment/Plan .  68-year-old female with history of seropositive RA, OA s/p bilateral TKA and right CRISTHIAN, s/p right shoulder surgery, endometrial CA s/p BENJAMIN/BSO (2017), HTN and obesity presented for follow-up.     #1: Seropositive RA.  She was relatively doing well with Orencia however she cannot continue due to high copayment.  -Begin sulfasalazine 1 pill twice a day.  -Continue leflunomide 20 mg daily.  -Prednisone as needed.  #2 osteopenia.  DEXA scan obtained in December 2022 showed  L1-L4 T score of -1.3 and right femoral neck T score of -1.0.  -Continue calcium and vitamin D.-    Follow-up in 3 months.     This note was partially generated using the Dragon Voice recognition system. There may be some incorrect wording, spelling and/or spelling errors or punctuation errors that were not corrected prior to committing the note to the medical record.    Patient Active Problem List   Diagnosis    Acute bronchitis    Adenomatous polyp of sigmoid colon    Adenomatous polyps    Ankle swelling    Arthritis of left elbow    Chronic diarrhea    Depression    Essential hypertension    Frequency-urgency syndrome    Overactive bladder    Hypercalcemia    Injury of left knee    Injury of toe on right foot    Lipoma of right lower extremity    Lumbago    Mixed hyperlipidemia    Nocturia    Osteopenia of multiple sites    Primary hyperparathyroidism (CMS/HCC)    Recurrent major depressive disorder, in full  remission (CMS/[facility])    Rheumatoid arthritis (CMS/[facility])    Shoulder arthritis    Right foot strain, initial encounter    Right shoulder pain    Shoulder impingement, right    Sore throat    Status post total replacement of right shoulder    Strain of left knee and leg    Tonsillitis    Tubular adenoma of colon    Yeast infection of the skin    Bilateral hearing loss due to cerumen impaction    B12 deficiency    Impaired fasting blood sugar    Vitamin D deficiency    Endometrial cancer (CMS/[facility])    H/O: hysterectomy    Urge incontinence    Primary localized osteoarthritis of left pelvic region and thigh    Morbid obesity with BMI of 40.0-44.9, adult (CMS/[facility])    Rheumatoid arthritis involving multiple sites with positive rheumatoid factor (CMS/[facility])      Past Surgical History:   Procedure Laterality Date    OTHER SURGICAL HISTORY  10/23/2019    Tubal ligation    OTHER SURGICAL HISTORY  10/23/2019    Dilation and curettage    OTHER SURGICAL HISTORY  10/23/2019    Total hysterectomy with removal of both tubes and ovaries    OTHER SURGICAL HISTORY  10/23/2019    Hip surgery    OTHER SURGICAL HISTORY  10/23/2019    Knee replacement    OTHER SURGICAL HISTORY  02/17/2020    Parathyroidectomy      Social History     Tobacco Use    Smoking status: Never    Smokeless tobacco: Never   Substance Use Topics    Alcohol use: Never      Family History   Problem Relation Name Age of Onset    Hypertension Father      Cancer Father      Diabetes type II Father        No Known Allergies   Current Outpatient Medications   Medication Instructions    citalopram (CELEXA) 20 mg, oral, Daily    folic acid/vit B complex and C (B complex-vitamin C-folic acid) 400 mcg tablet extended release 1 tablet, oral, Daily    leflunomide (ARAVA) 20 mg, oral, Daily    lisinopriL-hydrochlorothiazide 20-12.5 mg tablet 1 tablet, oral, Daily    metoprolol tartrate (Lopressor) 25 mg tablet 1 tablet, oral, Daily    naproxen sodium (ALEVE) 220 mg, oral, 2 times  daily    predniSONE (Deltasone) 5 mg tablet 1 tablet, oral, Daily    rosuvastatin (Crestor) 10 mg tablet 1 tablet, oral, Nightly    sulfaSALAzine (AZULFIDINE) 500 mg, oral, 2 times daily    tocilizumab (Actemra) 80 mg/4 mL (20 mg/mL) solution intravenous, Every 28 days    VITAMIN B COMPLEX ORAL 1 tablet, oral, Daily RT

## 2023-11-17 ENCOUNTER — TELEPHONE (OUTPATIENT)
Dept: RHEUMATOLOGY | Facility: CLINIC | Age: 68
End: 2023-11-17
Payer: MEDICARE

## 2023-11-17 NOTE — TELEPHONE ENCOUNTER
Patient stated she wanted to let you know that the medication you prescribed has caused her to have dizziness and her legs and ankle is swollen.

## 2023-11-22 ENCOUNTER — APPOINTMENT (OUTPATIENT)
Dept: HEMATOLOGY/ONCOLOGY | Facility: CLINIC | Age: 68
End: 2023-11-22
Payer: MEDICARE

## 2023-11-22 DIAGNOSIS — M05.79 RHEUMATOID ARTHRITIS INVOLVING MULTIPLE SITES WITH POSITIVE RHEUMATOID FACTOR (MULTI): Primary | ICD-10-CM

## 2023-12-13 ENCOUNTER — TELEPHONE (OUTPATIENT)
Dept: RHEUMATOLOGY | Facility: CLINIC | Age: 68
End: 2023-12-13
Payer: MEDICARE

## 2023-12-13 NOTE — TELEPHONE ENCOUNTER
Patient called-she was started on sulfasalazine 500mg twice a day. She called stating she had leg swelling and you told her to decrease to once a day.   Sunday she started with leg and ankle swelling and back and arm pain  She does not know if she should start with her pcp or if it could be the medication.  Her back hurts when she moves and cannot get comfortable   [phone]

## 2023-12-21 ENCOUNTER — LAB (OUTPATIENT)
Dept: LAB | Facility: LAB | Age: 68
End: 2023-12-21
Payer: MEDICARE

## 2023-12-22 DIAGNOSIS — M06.00 RHEUMATOID ARTHRITIS WITH NEGATIVE RHEUMATOID FACTOR, INVOLVING UNSPECIFIED SITE (MULTI): ICD-10-CM

## 2023-12-22 RX ORDER — LEFLUNOMIDE 20 MG/1
20 TABLET ORAL DAILY
Qty: 90 TABLET | Refills: 0 | Status: SHIPPED | OUTPATIENT
Start: 2023-12-22 | End: 2024-03-25 | Stop reason: SDUPTHER

## 2024-01-03 ENCOUNTER — LAB (OUTPATIENT)
Dept: LAB | Facility: LAB | Age: 69
End: 2024-01-03
Payer: MEDICARE

## 2024-01-03 DIAGNOSIS — E78.2 MIXED HYPERLIPIDEMIA: ICD-10-CM

## 2024-01-03 DIAGNOSIS — E53.8 B12 DEFICIENCY: ICD-10-CM

## 2024-01-03 DIAGNOSIS — I10 ESSENTIAL HYPERTENSION: ICD-10-CM

## 2024-01-03 DIAGNOSIS — E55.9 VITAMIN D DEFICIENCY: ICD-10-CM

## 2024-01-03 DIAGNOSIS — R73.01 IMPAIRED FASTING BLOOD SUGAR: ICD-10-CM

## 2024-01-03 LAB
ALBUMIN SERPL BCP-MCNC: 3.8 G/DL (ref 3.4–5)
ALP SERPL-CCNC: 79 U/L (ref 33–136)
ALT SERPL W P-5'-P-CCNC: 23 U/L (ref 7–45)
ANION GAP SERPL CALC-SCNC: 10 MMOL/L (ref 10–20)
AST SERPL W P-5'-P-CCNC: 18 U/L (ref 9–39)
BILIRUB SERPL-MCNC: 0.4 MG/DL (ref 0–1.2)
BUN SERPL-MCNC: 19 MG/DL (ref 6–23)
CALCIUM SERPL-MCNC: 9.1 MG/DL (ref 8.6–10.3)
CHLORIDE SERPL-SCNC: 108 MMOL/L (ref 98–107)
CHOLEST SERPL-MCNC: 220 MG/DL (ref 0–199)
CHOLESTEROL/HDL RATIO: 4.4
CO2 SERPL-SCNC: 28 MMOL/L (ref 21–32)
CREAT SERPL-MCNC: 0.81 MG/DL (ref 0.5–1.05)
CREAT UR-MCNC: 255.1 MG/DL (ref 20–320)
ERYTHROCYTE [DISTWIDTH] IN BLOOD BY AUTOMATED COUNT: 14.6 % (ref 11.5–14.5)
GFR SERPL CREATININE-BSD FRML MDRD: 80 ML/MIN/1.73M*2
GLUCOSE SERPL-MCNC: 99 MG/DL (ref 74–99)
HCT VFR BLD AUTO: 41.4 % (ref 36–46)
HDLC SERPL-MCNC: 49.5 MG/DL
HGB BLD-MCNC: 12.8 G/DL (ref 12–16)
LDLC SERPL CALC-MCNC: 125 MG/DL
MCH RBC QN AUTO: 29.2 PG (ref 26–34)
MCHC RBC AUTO-ENTMCNC: 30.9 G/DL (ref 32–36)
MCV RBC AUTO: 94 FL (ref 80–100)
MICROALBUMIN UR-MCNC: 21.4 MG/L
MICROALBUMIN/CREAT UR: 8.4 UG/MG CREAT
NON HDL CHOLESTEROL: 171 MG/DL (ref 0–149)
NRBC BLD-RTO: 0 /100 WBCS (ref 0–0)
PLATELET # BLD AUTO: 287 X10*3/UL (ref 150–450)
POTASSIUM SERPL-SCNC: 4.3 MMOL/L (ref 3.5–5.3)
PROT SERPL-MCNC: 6 G/DL (ref 6.4–8.2)
RBC # BLD AUTO: 4.39 X10*6/UL (ref 4–5.2)
SODIUM SERPL-SCNC: 142 MMOL/L (ref 136–145)
TRIGL SERPL-MCNC: 228 MG/DL (ref 0–149)
TSH SERPL-ACNC: 0.84 MIU/L (ref 0.44–3.98)
VIT B12 SERPL-MCNC: 482 PG/ML (ref 211–911)
VLDL: 46 MG/DL (ref 0–40)
WBC # BLD AUTO: 4.3 X10*3/UL (ref 4.4–11.3)

## 2024-01-03 PROCEDURE — 82652 VIT D 1 25-DIHYDROXY: CPT

## 2024-01-03 PROCEDURE — 83036 HEMOGLOBIN GLYCOSYLATED A1C: CPT

## 2024-01-03 PROCEDURE — 80061 LIPID PANEL: CPT

## 2024-01-03 PROCEDURE — 80053 COMPREHEN METABOLIC PANEL: CPT

## 2024-01-03 PROCEDURE — 82607 VITAMIN B-12: CPT

## 2024-01-03 PROCEDURE — 84443 ASSAY THYROID STIM HORMONE: CPT

## 2024-01-03 PROCEDURE — 82570 ASSAY OF URINE CREATININE: CPT

## 2024-01-03 PROCEDURE — 85027 COMPLETE CBC AUTOMATED: CPT

## 2024-01-03 PROCEDURE — 82043 UR ALBUMIN QUANTITATIVE: CPT

## 2024-01-03 PROCEDURE — 36415 COLL VENOUS BLD VENIPUNCTURE: CPT

## 2024-01-04 LAB
EST. AVERAGE GLUCOSE BLD GHB EST-MCNC: 111 MG/DL
HBA1C MFR BLD: 5.5 %

## 2024-01-05 ENCOUNTER — OFFICE VISIT (OUTPATIENT)
Dept: PRIMARY CARE | Facility: CLINIC | Age: 69
End: 2024-01-05
Payer: MEDICARE

## 2024-01-05 VITALS
WEIGHT: 258 LBS | DIASTOLIC BLOOD PRESSURE: 80 MMHG | BODY MASS INDEX: 45.71 KG/M2 | HEIGHT: 63 IN | HEART RATE: 69 BPM | RESPIRATION RATE: 18 BRPM | OXYGEN SATURATION: 95 % | TEMPERATURE: 96.8 F | SYSTOLIC BLOOD PRESSURE: 128 MMHG

## 2024-01-05 DIAGNOSIS — E53.8 B12 DEFICIENCY: ICD-10-CM

## 2024-01-05 DIAGNOSIS — Z13.29 THYROID DISORDER SCREEN: ICD-10-CM

## 2024-01-05 DIAGNOSIS — Z00.00 ROUTINE GENERAL MEDICAL EXAMINATION AT HEALTH CARE FACILITY: Primary | ICD-10-CM

## 2024-01-05 DIAGNOSIS — I10 ESSENTIAL HYPERTENSION: ICD-10-CM

## 2024-01-05 DIAGNOSIS — E55.9 VITAMIN D DEFICIENCY: ICD-10-CM

## 2024-01-05 DIAGNOSIS — E78.2 MIXED HYPERLIPIDEMIA: ICD-10-CM

## 2024-01-05 DIAGNOSIS — M05.79 RHEUMATOID ARTHRITIS INVOLVING MULTIPLE SITES WITH POSITIVE RHEUMATOID FACTOR (MULTI): ICD-10-CM

## 2024-01-05 DIAGNOSIS — F33.1 MODERATE EPISODE OF RECURRENT MAJOR DEPRESSIVE DISORDER (MULTI): ICD-10-CM

## 2024-01-05 DIAGNOSIS — R73.01 IMPAIRED FASTING BLOOD SUGAR: ICD-10-CM

## 2024-01-05 DIAGNOSIS — E66.01 MORBID OBESITY WITH BMI OF 40.0-44.9, ADULT (MULTI): ICD-10-CM

## 2024-01-05 PROBLEM — M06.9 RHEUMATOID ARTHRITIS (MULTI): Status: RESOLVED | Noted: 2023-04-27 | Resolved: 2024-01-05

## 2024-01-05 LAB — 1,25(OH)2D3 SERPL-MCNC: 31 PG/ML (ref 19.9–79.3)

## 2024-01-05 PROCEDURE — 3074F SYST BP LT 130 MM HG: CPT | Performed by: NURSE PRACTITIONER

## 2024-01-05 PROCEDURE — 1159F MED LIST DOCD IN RCRD: CPT | Performed by: NURSE PRACTITIONER

## 2024-01-05 PROCEDURE — 1123F ACP DISCUSS/DSCN MKR DOCD: CPT | Performed by: NURSE PRACTITIONER

## 2024-01-05 PROCEDURE — 1036F TOBACCO NON-USER: CPT | Performed by: NURSE PRACTITIONER

## 2024-01-05 PROCEDURE — 3078F DIAST BP <80 MM HG: CPT | Performed by: NURSE PRACTITIONER

## 2024-01-05 PROCEDURE — G0439 PPPS, SUBSEQ VISIT: HCPCS | Performed by: NURSE PRACTITIONER

## 2024-01-05 PROCEDURE — 3008F BODY MASS INDEX DOCD: CPT | Performed by: NURSE PRACTITIONER

## 2024-01-05 PROCEDURE — 1170F FXNL STATUS ASSESSED: CPT | Performed by: NURSE PRACTITIONER

## 2024-01-05 PROCEDURE — 1126F AMNT PAIN NOTED NONE PRSNT: CPT | Performed by: NURSE PRACTITIONER

## 2024-01-05 RX ORDER — CITALOPRAM 40 MG/1
40 TABLET, FILM COATED ORAL DAILY
Qty: 90 TABLET | Refills: 1 | Status: SHIPPED | OUTPATIENT
Start: 2024-01-05 | End: 2024-07-03

## 2024-01-05 RX ORDER — LISINOPRIL AND HYDROCHLOROTHIAZIDE 12.5; 2 MG/1; MG/1
1 TABLET ORAL DAILY
Qty: 90 TABLET | Refills: 1 | Status: SHIPPED | OUTPATIENT
Start: 2024-01-05 | End: 2024-07-03

## 2024-01-05 RX ORDER — ROSUVASTATIN CALCIUM 10 MG/1
10 TABLET, COATED ORAL NIGHTLY
Qty: 90 TABLET | Refills: 1 | Status: SHIPPED | OUTPATIENT
Start: 2024-01-05 | End: 2024-07-03

## 2024-01-05 ASSESSMENT — ENCOUNTER SYMPTOMS
FEVER: 0
ACTIVITY CHANGE: 0
SHORTNESS OF BREATH: 0
CHILLS: 0
ARTHRALGIAS: 1
SLEEP DISTURBANCE: 0
COUGH: 0
CONFUSION: 0
LOSS OF SENSATION IN FEET: 0
WEAKNESS: 0
NAUSEA: 0
MYALGIAS: 0
APNEA: 0
SORE THROAT: 0
PALPITATIONS: 0
VOMITING: 0
CONSTIPATION: 0
OCCASIONAL FEELINGS OF UNSTEADINESS: 0
ABDOMINAL PAIN: 0
NERVOUS/ANXIOUS: 0
FATIGUE: 0
CONSTITUTIONAL NEGATIVE: 1
HEADACHES: 0
DIZZINESS: 0
SPEECH DIFFICULTY: 0
DIARRHEA: 0
DEPRESSION: 1
WHEEZING: 0

## 2024-01-05 ASSESSMENT — ACTIVITIES OF DAILY LIVING (ADL)
DRESSING: INDEPENDENT
DOING_HOUSEWORK: INDEPENDENT
MANAGING_FINANCES: INDEPENDENT
TAKING_MEDICATION: INDEPENDENT
GROCERY_SHOPPING: INDEPENDENT
BATHING: INDEPENDENT

## 2024-01-05 NOTE — ASSESSMENT & PLAN NOTE
.Discussed DASH diet and dietary sodium restrictions  Continue/Increase dietary efforts and physical activity    Call for consistently elevated bp>140/90  Follow up 6 months

## 2024-01-05 NOTE — PROGRESS NOTES
Subjective   Reason for Visit: Penny Rosa is an 68 y.o. female here for a Medicare Wellness visit.               Hypertension: Patient on metoprolol 25 mg once daily and lisinopril-hydrochlorothiazide.  Patient has elevated blood pressure initially and upon recheck normal.    Depression: On citalopram 20 mg daily. Patient tearful today. She has lost a child and during the holidays her depression increases and she is more sad.  Patient has tried support groups of other mothers that have lost children.    Mixed hyperlipidemia: LDL elevated, triglycerides elevated.  Patient has stopped taking rosuvastatin.  Encouraged to restart medication and work on a low saturated and trans fat diet    Rheumatoid arthritis: Managed by rheumatology.  On leflunomide.  Reports her insurance is no longer covering her infusion and she is not able to pay the 80% cost of $600.  Also taking Aleve twice daily as needed.  Patient reports her pain has increased and she has upcoming follow-up with rheumatology    Morbid Obesity: BMI 45.70    .Lab Results       Component                Value               Date                       CHOL                     220 (H)             01/03/2024                 CHOL                     151                 09/26/2023                 CHOL                     148                 08/30/2023            Lab Results       Component                Value               Date                       HDL                      49.5                01/03/2024                 HDL                      77.2                09/26/2023                 HDL                      66.7                08/30/2023            Lab Results       Component                Value               Date                       LDLCALC                  125 (H)             01/03/2024            Lab Results       Component                Value               Date                       TRIG                     228 (H)             01/03/2024      "            TRIG                     107                 09/26/2023                 TRIG                     135                 08/30/2023            No components found for: \"CHOLHDL\"         .  Current Outpatient Medications:   ·  citalopram (CeleXA) 20 mg tablet, Take 1 tablet (20 mg) by mouth once daily., Disp: , Rfl:   ·  leflunomide (Arava) 20 mg tablet, Take 1 tablet (20 mg) by mouth once daily., Disp: 90 tablet, Rfl: 0  ·  metoprolol tartrate (Lopressor) 25 mg tablet, Take 1 tablet (25 mg) by mouth once daily., Disp: , Rfl:   ·  naproxen sodium (Aleve) 220 mg tablet, Take 1 tablet (220 mg) by mouth twice a day., Disp: , Rfl:   ·  VITAMIN B COMPLEX ORAL, Take 1 tablet by mouth once daily., Disp: , Rfl:   ·  citalopram (CeleXA) 40 mg tablet, Take 1 tablet (40 mg) by mouth once daily., Disp: 90 tablet, Rfl: 1  ·  folic acid/vit B complex and C (B complex-vitamin C-folic acid) 400 mcg tablet extended release, Take 1 tablet by mouth once daily., Disp: , Rfl:   ·  lisinopriL-hydrochlorothiazide 20-12.5 mg tablet, Take 1 tablet by mouth once daily., Disp: 90 tablet, Rfl: 1  ·  rosuvastatin (Crestor) 10 mg tablet, Take 1 tablet (10 mg) by mouth once daily at bedtime., Disp: 90 tablet, Rfl: 1         Patient Care Team:  ZAIDA Levine as PCP - General  ZAIDA Levine as PCP - O Medicare Advantage PCP     Review of Systems   Constitutional: Negative.  Negative for activity change, chills, fatigue and fever.   HENT:  Negative for congestion, postnasal drip, sneezing and sore throat.    Respiratory:  Negative for apnea, cough, shortness of breath and wheezing.    Cardiovascular:  Negative for chest pain and palpitations.   Gastrointestinal:  Negative for abdominal pain, constipation, diarrhea, nausea and vomiting.   Musculoskeletal:  Positive for arthralgias. Negative for myalgias.   Neurological:  Negative for dizziness, syncope, speech difficulty, weakness and headaches.   Psychiatric/Behavioral:  " "Negative for confusion, self-injury, sleep disturbance and suicidal ideas. The patient is not nervous/anxious.        Objective   Vitals:  /80   Pulse 69   Temp 36 °C (96.8 °F) (Temporal)   Resp 18   Ht 1.6 m (5' 3\")   Wt 117 kg (258 lb)   SpO2 95%   BMI 45.70 kg/m²       Physical Exam  Vitals reviewed.   Constitutional:       Appearance: Normal appearance.   Eyes:      Pupils: Pupils are equal, round, and reactive to light.   Cardiovascular:      Rate and Rhythm: Normal rate and regular rhythm.      Pulses: Normal pulses.      Heart sounds: Normal heart sounds.   Pulmonary:      Effort: Pulmonary effort is normal.      Breath sounds: Normal breath sounds.   Abdominal:      General: Bowel sounds are normal.   Neurological:      Mental Status: She is alert and oriented to person, place, and time.   Psychiatric:         Mood and Affect: Mood normal.         Behavior: Behavior normal.         Assessment/Plan   Problem List Items Addressed This Visit       Depression    Relevant Medications    citalopram (CeleXA) 40 mg tablet    Essential hypertension    Current Assessment & Plan     .Discussed DASH diet and dietary sodium restrictions  Continue/Increase dietary efforts and physical activity    Call for consistently elevated bp>140/90  Follow up 6 months            Relevant Medications    lisinopriL-hydrochlorothiazide 20-12.5 mg tablet    Other Relevant Orders    Albumin , Urine Random    CBC    Comprehensive Metabolic Panel    Mixed hyperlipidemia    Current Assessment & Plan     Restart statin   LDL elevated   Follow up labs 6 mos,         Relevant Medications    rosuvastatin (Crestor) 10 mg tablet    Other Relevant Orders    Lipid Panel    LDL cholesterol, direct    B12 deficiency    Current Assessment & Plan     B12 normal   Continue supplement         Relevant Orders    Vitamin B12    Impaired fasting blood sugar    Current Assessment & Plan     Well controlled   Continue current poc  Lab Results "   Component Value Date    HGBA1C 5.5 01/03/2024             Relevant Orders    Hemoglobin A1C    Vitamin D deficiency    Current Assessment & Plan     Hx of hyperparathyroidism   Continue to trend vitamin d level and supplement as needed         Relevant Orders    Vitamin D 1,25 Dihydroxy (for eval of hypercalcemia)    Morbid obesity with BMI of 40.0-44.9, adult (CMS/[facility])    Current Assessment & Plan     Work on diet/exercise plan   Follow up 6 months          Rheumatoid arthritis involving multiple sites with positive rheumatoid factor (CMS/[facility])    Current Assessment & Plan     Follow up rheumatology   Continue current poc   Increasing symptoms. No longer on infusions          Other Visit Diagnoses       Routine general medical examination at health care facility    -  Primary    Thyroid disorder screen        Relevant Orders    TSH with reflex to Free T4 if abnormal

## 2024-01-05 NOTE — PROGRESS NOTES
"Subjective   Reason for Visit: Penny Rosa is an 68 y.o. female here for a Medicare Wellness visit.               HPI    Patient Care Team:  ZAIDA Levine as PCP - General  ZAIDA Levine as PCP - O Medicare Advantage PCP     Review of Systems    Objective   Vitals:  Pulse 61   Temp 36 °C (96.8 °F) (Temporal)   Resp 18   Ht 1.6 m (5' 3\")   Wt 117 kg (258 lb)   SpO2 95%   BMI 45.70 kg/m²       Physical Exam    Assessment/Plan   Problem List Items Addressed This Visit     Depression    Relevant Medications    citalopram (CeleXA) 40 mg tablet    Essential hypertension    Relevant Medications    lisinopriL-hydrochlorothiazide 20-12.5 mg tablet    Mixed hyperlipidemia    Relevant Medications    rosuvastatin (Crestor) 10 mg tablet   Other Visit Diagnoses     Routine general medical examination at health care facility    -  Primary             "

## 2024-01-05 NOTE — ASSESSMENT & PLAN NOTE
Well controlled   Continue current poc  Lab Results   Component Value Date    HGBA1C 5.5 01/03/2024

## 2024-02-05 ENCOUNTER — OFFICE VISIT (OUTPATIENT)
Dept: RHEUMATOLOGY | Facility: CLINIC | Age: 69
End: 2024-02-05
Payer: MEDICARE

## 2024-02-05 VITALS — SYSTOLIC BLOOD PRESSURE: 131 MMHG | WEIGHT: 253 LBS | DIASTOLIC BLOOD PRESSURE: 77 MMHG | BODY MASS INDEX: 44.82 KG/M2

## 2024-02-05 DIAGNOSIS — M05.79 RHEUMATOID ARTHRITIS INVOLVING MULTIPLE SITES WITH POSITIVE RHEUMATOID FACTOR (MULTI): Primary | ICD-10-CM

## 2024-02-05 PROCEDURE — 3078F DIAST BP <80 MM HG: CPT | Performed by: INTERNAL MEDICINE

## 2024-02-05 PROCEDURE — 1126F AMNT PAIN NOTED NONE PRSNT: CPT | Performed by: INTERNAL MEDICINE

## 2024-02-05 PROCEDURE — 1159F MED LIST DOCD IN RCRD: CPT | Performed by: INTERNAL MEDICINE

## 2024-02-05 PROCEDURE — 3075F SYST BP GE 130 - 139MM HG: CPT | Performed by: INTERNAL MEDICINE

## 2024-02-05 PROCEDURE — 1036F TOBACCO NON-USER: CPT | Performed by: INTERNAL MEDICINE

## 2024-02-05 PROCEDURE — 99213 OFFICE O/P EST LOW 20 MIN: CPT | Performed by: INTERNAL MEDICINE

## 2024-02-05 PROCEDURE — 3008F BODY MASS INDEX DOCD: CPT | Performed by: INTERNAL MEDICINE

## 2024-02-05 RX ORDER — ALBUTEROL SULFATE 0.83 MG/ML
3 SOLUTION RESPIRATORY (INHALATION) AS NEEDED
Status: CANCELLED | OUTPATIENT
Start: 2024-02-05

## 2024-02-05 RX ORDER — FAMOTIDINE 10 MG/ML
20 INJECTION INTRAVENOUS ONCE AS NEEDED
Status: CANCELLED | OUTPATIENT
Start: 2024-02-05

## 2024-02-05 RX ORDER — DIPHENHYDRAMINE HYDROCHLORIDE 50 MG/ML
50 INJECTION INTRAMUSCULAR; INTRAVENOUS AS NEEDED
Status: CANCELLED | OUTPATIENT
Start: 2024-02-05

## 2024-02-05 RX ORDER — EPINEPHRINE 0.3 MG/.3ML
0.3 INJECTION SUBCUTANEOUS EVERY 5 MIN PRN
Status: CANCELLED | OUTPATIENT
Start: 2024-02-05

## 2024-02-05 NOTE — PROGRESS NOTES
Subjective  . Penny Rosa is a 68 y.o. female who presents for Arthritis and Follow-up.    Arthritis    . 68-year-old female with history of seropositive RA, OA s/p bilateral TKA and right CRISTHIAN, s/p right shoulder surgery, endometrial CA s/p BENJAMIN/BSO (2017), HTN and obesity presented for follow-up.     She reports ankle pain and swelling.  Right ankle hurts more than left.    She could not tolerate sulfasalazine due to dizziness and ankle swelling.  She states insurance told her that they discharged because he has got more units than the units approved.    Immunosuppression:Immunosuppression: Immunosuppression: Leflunomide (8/2021) and Actemra (7/2022).    Past immunosuppressive therapy:  1. MTX.  2. Humira.  3. Enbrel  4. Actemra. 7/2019- 8/2021. Resumed in July 2022.   5. Prednisone.    Review of Systems   Musculoskeletal:  Positive for arthritis.   All other systems reviewed and are negative.    Objective     Blood pressure 131/77, weight 115 kg (253 lb).    Physical Exam.  Gen. AAO x3, NAD.  HEENT: No pallor or icterus, PERRLA, EOMI. Parotid glands  not enlarged. No cervical lymphadenopathy .  Skin: No rashes.  Heart: S1, S2/ RRR.   Lungs: CTA B.  Abdomen: Soft, NT/ND, BS regular.  MSK: Chronic synovial proliferation of the MCP joint with decreased range of motion and subluxation of the MCP joints of the right hand.  She has bilateral swan-neck and boutonniere deformities.  Bilateral wrist with severely decreased range of motion.  Bilateral ankle with medial and lateral joint line tenderness.  MTP without swelling and tenderness.    Neuro: Sensation to touch intact.Strength 5/5 throughout.   Psych:Appropriate mood and behavior  EXT: No edema      Assessment/Plan . 68-year-old female with history of seropositive RA, OA s/p bilateral TKA and right CRISTHIAN, s/p right shoulder surgery, endometrial CA s/p BENJAMIN/BSO (2017), HTN and obesity presented for follow-up.     #1: Seropositive RA.  Chronic deformities in the  hands. Active.  She was doing much better with Actemra however Actemra could not be continued since October due to insurance issues.  We will begin preauthorization again.  -Continue leflunomide 20 mg daily.  -Prednisone as needed.  -Labs.    #2: Osteopenia.  Continue calcium and vitamin D.  Obtain DEXA scan in the summer 2024.    Follow-up in 3 months.     This note was partially generated using the Dragon Voice recognition system. There may be some incorrect wording, spelling and/or spelling errors or punctuation errors that were not corrected prior to committing the note to the medical record.  Problem List Items Addressed This Visit       Rheumatoid arthritis involving multiple sites with positive rheumatoid factor (CMS/[facility]) - Primary    Relevant Orders    C-reactive protein    Uric acid    Sedimentation Rate         Active Ambulatory Problems     Diagnosis Date Noted    Acute bronchitis 04/27/2023    Adenomatous polyp of sigmoid colon 04/27/2023    Adenomatous polyps 04/27/2023    Ankle swelling 04/27/2023    Arthritis of left elbow 04/27/2023    Chronic diarrhea 04/27/2023    Depression 04/27/2023    Essential hypertension 04/27/2023    Frequency-urgency syndrome 04/27/2023    Overactive bladder 04/27/2023    Hypercalcemia 04/27/2023    Injury of left knee 04/27/2023    Injury of toe on right foot 04/27/2023    Lipoma of right lower extremity 04/27/2023    Lumbago 04/27/2023    Mixed hyperlipidemia 04/27/2023    Nocturia 04/27/2023    Osteopenia of multiple sites 04/27/2023    Primary hyperparathyroidism (CMS/HCC) 04/27/2023    Recurrent major depressive disorder, in full remission (CMS/HCC) 04/27/2023    Shoulder arthritis 04/27/2023    Right foot strain, initial encounter 04/27/2023    Right shoulder pain 04/27/2023    Shoulder impingement, right 04/27/2023    Sore throat 04/27/2023    Status post total replacement of right shoulder 04/27/2023    Strain of left knee and leg 04/27/2023    Tonsillitis  04/27/2023    Tubular adenoma of colon 04/27/2023    Yeast infection of the skin 04/27/2023    Bilateral hearing loss due to cerumen impaction 06/30/2023    B12 deficiency 06/30/2023    Impaired fasting blood sugar 06/30/2023    Vitamin D deficiency 06/30/2023    Endometrial cancer (CMS/HCC) 10/22/2023    H/O: hysterectomy 10/22/2023    Urge incontinence 10/22/2023    Primary localized osteoarthritis of left pelvic region and thigh 06/01/2010    Morbid obesity with BMI of 40.0-44.9, adult (CMS/[facility]) 10/22/2023    Rheumatoid arthritis involving multiple sites with positive rheumatoid factor (CMS/HCC) 10/25/2023     Resolved Ambulatory Problems     Diagnosis Date Noted    Rheumatoid arthritis (CMS/HCC) 04/27/2023     Past Medical History:   Diagnosis Date    Personal history of malignant neoplasm of other parts of uterus     Personal history of other diseases of the circulatory system     Personal history of other diseases of the musculoskeletal system and connective tissue     Personal history of other diseases of the musculoskeletal system and connective tissue     Personal history of other specified conditions     Unspecified osteoarthritis, unspecified site        Family History   Problem Relation Name Age of Onset    Hypertension Father      Cancer Father      Diabetes type II Father         Past Surgical History:   Procedure Laterality Date    OTHER SURGICAL HISTORY  10/23/2019    Tubal ligation    OTHER SURGICAL HISTORY  10/23/2019    Dilation and curettage    OTHER SURGICAL HISTORY  10/23/2019    Total hysterectomy with removal of both tubes and ovaries    OTHER SURGICAL HISTORY  10/23/2019    Hip surgery    OTHER SURGICAL HISTORY  10/23/2019    Knee replacement    OTHER SURGICAL HISTORY  02/17/2020    Parathyroidectomy       Social History     Tobacco Use   Smoking Status Never   Smokeless Tobacco Never       Allergies  Patient has no known allergies.    Current Meds  Current Outpatient Medications    Medication Instructions    citalopram (CELEXA) 20 mg, oral, Daily    citalopram (CELEXA) 40 mg, oral, Daily    folic acid/vit B complex and C (B complex-vitamin C-folic acid) 400 mcg tablet extended release 1 tablet, oral, Daily    leflunomide (ARAVA) 20 mg, oral, Daily    lisinopriL-hydrochlorothiazide 20-12.5 mg tablet 1 tablet, oral, Daily    metoprolol tartrate (Lopressor) 25 mg tablet 1 tablet, oral, Daily    naproxen sodium (ALEVE) 220 mg, oral, 2 times daily    rosuvastatin (CRESTOR) 10 mg, oral, Nightly    VITAMIN B COMPLEX ORAL 1 tablet, oral, Daily RT        Lab Results   Component Value Date    RF 16 (H) 04/05/2022    SEDRATE <1 10/24/2023    CRP <0.10 10/24/2023    URICACID 4.0 04/05/2022             Jerel Kolb MD

## 2024-02-15 ENCOUNTER — LAB (OUTPATIENT)
Dept: LAB | Facility: LAB | Age: 69
End: 2024-02-15
Payer: MEDICARE

## 2024-02-15 DIAGNOSIS — M05.79 RHEUMATOID ARTHRITIS INVOLVING MULTIPLE SITES WITH POSITIVE RHEUMATOID FACTOR (MULTI): ICD-10-CM

## 2024-02-15 LAB
CRP SERPL-MCNC: 0.23 MG/DL
ERYTHROCYTE [SEDIMENTATION RATE] IN BLOOD BY WESTERGREN METHOD: 26 MM/H (ref 0–30)
URATE SERPL-MCNC: 4.2 MG/DL (ref 2.3–6.7)

## 2024-02-15 PROCEDURE — 86140 C-REACTIVE PROTEIN: CPT

## 2024-02-15 PROCEDURE — 84550 ASSAY OF BLOOD/URIC ACID: CPT

## 2024-02-15 PROCEDURE — 85652 RBC SED RATE AUTOMATED: CPT

## 2024-02-15 PROCEDURE — 36415 COLL VENOUS BLD VENIPUNCTURE: CPT

## 2024-02-16 ENCOUNTER — TELEPHONE (OUTPATIENT)
Dept: PRIMARY CARE | Facility: CLINIC | Age: 69
End: 2024-02-16
Payer: MEDICARE

## 2024-02-16 NOTE — TELEPHONE ENCOUNTER
I called and spoke to a different lady name brandin and brandin said she could not help me dur to needing an icd10 code which I know what that is but however in reference to that she needs a procedure code. I do not know what a procedure code is

## 2024-02-16 NOTE — TELEPHONE ENCOUNTER
Patient stated she wants to give you some information about the infusion.   Requesting a call back.

## 2024-02-16 NOTE — TELEPHONE ENCOUNTER
I called this number and the lady representative said she could not help since the patient is not a medical mutual member

## 2024-02-20 NOTE — TELEPHONE ENCOUNTER
I know what the icd 10 code is as stated in the previous message before however I called and the representative is still stating theyu need a procedure code to help the patient

## 2024-03-08 ENCOUNTER — TELEPHONE (OUTPATIENT)
Dept: PRIMARY CARE | Facility: CLINIC | Age: 69
End: 2024-03-08
Payer: MEDICARE

## 2024-03-08 DIAGNOSIS — I10 ESSENTIAL HYPERTENSION: ICD-10-CM

## 2024-03-08 RX ORDER — METOPROLOL TARTRATE 25 MG/1
25 TABLET, FILM COATED ORAL DAILY
Qty: 90 TABLET | Refills: 1 | Status: SHIPPED | OUTPATIENT
Start: 2024-03-08

## 2024-03-08 NOTE — TELEPHONE ENCOUNTER
Rx Refill Request Telephone Encounter    Name:  Penny WARNER Moe  :  [id number]  Medication Name:      metoprolol tartrate (Lopressor) 25 mg tablet   Route: Take 1 tablet (25 mg) by mouth once daily.       Specific Pharmacy location:  [id number] [address]   Date of last appointment:  24  Date of next appointment:  24  Best number to reach patient: [phone]

## 2024-03-25 ENCOUNTER — TELEPHONE (OUTPATIENT)
Dept: PRIMARY CARE | Facility: CLINIC | Age: 69
End: 2024-03-25

## 2024-03-25 ENCOUNTER — OFFICE VISIT (OUTPATIENT)
Dept: PRIMARY CARE | Facility: CLINIC | Age: 69
End: 2024-03-25
Payer: MEDICARE

## 2024-03-25 VITALS
OXYGEN SATURATION: 96 % | DIASTOLIC BLOOD PRESSURE: 81 MMHG | SYSTOLIC BLOOD PRESSURE: 171 MMHG | TEMPERATURE: 97.9 F | RESPIRATION RATE: 18 BRPM | WEIGHT: 253 LBS | BODY MASS INDEX: 44.83 KG/M2 | HEART RATE: 58 BPM | HEIGHT: 63 IN

## 2024-03-25 DIAGNOSIS — M06.00 RHEUMATOID ARTHRITIS WITH NEGATIVE RHEUMATOID FACTOR, INVOLVING UNSPECIFIED SITE (MULTI): ICD-10-CM

## 2024-03-25 DIAGNOSIS — J02.9 PHARYNGITIS, UNSPECIFIED ETIOLOGY: Primary | ICD-10-CM

## 2024-03-25 DIAGNOSIS — J04.0 LARYNGITIS ACUTE, SPASMODIC: ICD-10-CM

## 2024-03-25 PROCEDURE — 1159F MED LIST DOCD IN RCRD: CPT | Performed by: NURSE PRACTITIONER

## 2024-03-25 PROCEDURE — 3077F SYST BP >= 140 MM HG: CPT | Performed by: NURSE PRACTITIONER

## 2024-03-25 PROCEDURE — 99213 OFFICE O/P EST LOW 20 MIN: CPT | Performed by: NURSE PRACTITIONER

## 2024-03-25 PROCEDURE — 1036F TOBACCO NON-USER: CPT | Performed by: NURSE PRACTITIONER

## 2024-03-25 PROCEDURE — 1123F ACP DISCUSS/DSCN MKR DOCD: CPT | Performed by: NURSE PRACTITIONER

## 2024-03-25 PROCEDURE — 3008F BODY MASS INDEX DOCD: CPT | Performed by: NURSE PRACTITIONER

## 2024-03-25 PROCEDURE — 3079F DIAST BP 80-89 MM HG: CPT | Performed by: NURSE PRACTITIONER

## 2024-03-25 RX ORDER — ROSUVASTATIN CALCIUM 10 MG/1
10 TABLET, COATED ORAL NIGHTLY
Qty: 90 TABLET | Refills: 1 | Status: CANCELLED | OUTPATIENT
Start: 2024-03-25 | End: 2024-09-21

## 2024-03-25 RX ORDER — CITALOPRAM 40 MG/1
40 TABLET, FILM COATED ORAL DAILY
Qty: 90 TABLET | Refills: 1 | Status: CANCELLED | OUTPATIENT
Start: 2024-03-25 | End: 2024-09-21

## 2024-03-25 RX ORDER — METOPROLOL TARTRATE 25 MG/1
25 TABLET, FILM COATED ORAL DAILY
Qty: 90 TABLET | Refills: 1 | Status: CANCELLED | OUTPATIENT
Start: 2024-03-25

## 2024-03-25 RX ORDER — AMOXICILLIN AND CLAVULANATE POTASSIUM 500; 125 MG/1; MG/1
500 TABLET, FILM COATED ORAL 3 TIMES DAILY
Qty: 21 TABLET | Refills: 0 | Status: SHIPPED | OUTPATIENT
Start: 2024-03-25 | End: 2024-04-01

## 2024-03-25 RX ORDER — PREDNISONE 20 MG/1
40 TABLET ORAL DAILY
Qty: 10 TABLET | Refills: 0 | Status: SHIPPED | OUTPATIENT
Start: 2024-03-25 | End: 2024-03-30

## 2024-03-25 RX ORDER — LISINOPRIL AND HYDROCHLOROTHIAZIDE 12.5; 2 MG/1; MG/1
1 TABLET ORAL DAILY
Qty: 90 TABLET | Refills: 1 | Status: CANCELLED | OUTPATIENT
Start: 2024-03-25 | End: 2024-09-21

## 2024-03-25 RX ORDER — LEFLUNOMIDE 20 MG/1
20 TABLET ORAL DAILY
Qty: 90 TABLET | Refills: 0 | Status: SHIPPED | OUTPATIENT
Start: 2024-03-25 | End: 2024-06-23

## 2024-03-25 ASSESSMENT — ENCOUNTER SYMPTOMS
CHILLS: 0
SHORTNESS OF BREATH: 1
FEVER: 0
VOMITING: 0
DIZZINESS: 0
NAUSEA: 0
FATIGUE: 0
COUGH: 1
HEADACHES: 0
SORE THROAT: 1
PALPITATIONS: 0

## 2024-03-25 NOTE — PROGRESS NOTES
"Subjective   Patient ID: ePnny Rosa is a 69 y.o. female who presents for Cough (Past 5 days ), Sore Throat (Past 5 days ), and Shortness of Breath.    Sore throat, cough. Thursday symptoms started. Returned from [place] Friday. Reports she felt fine the entire vacation until the last day  Taking nyquil, took some prednisone that she had left over 20mg/ twice daily for 2 days -No more at home  Denies fever, sneezing, body aches, chills and headaches   No one else sick in home   Did not take home covid test      Cough  Associated symptoms include a sore throat and shortness of breath. Pertinent negatives include no chest pain, chills, fever or headaches.   Sore Throat   Associated symptoms include coughing and shortness of breath. Pertinent negatives include no headaches or vomiting.   Shortness of Breath  Associated symptoms include a sore throat. Pertinent negatives include no chest pain, fever, headaches or vomiting.        Review of Systems   Constitutional:  Negative for chills, fatigue and fever.   HENT:  Positive for sore throat.    Respiratory:  Positive for cough and shortness of breath.    Cardiovascular:  Negative for chest pain and palpitations.   Gastrointestinal:  Negative for nausea and vomiting.   Neurological:  Negative for dizziness and headaches.       Objective   /81   Pulse 58   Temp 36.6 °C (97.9 °F) (Temporal)   Resp 18   Ht 1.6 m (5' 3\")   Wt 115 kg (253 lb) Comment: patient reported  SpO2 96%   BMI 44.82 kg/m²     Physical Exam  Constitutional:       Appearance: Normal appearance.   HENT:      Nose: Congestion present.      Mouth/Throat:      Pharynx: Posterior oropharyngeal erythema present. No pharyngeal swelling.   Cardiovascular:      Rate and Rhythm: Normal rate and regular rhythm.      Pulses: Normal pulses.      Heart sounds: Normal heart sounds.   Pulmonary:      Effort: Pulmonary effort is normal.      Breath sounds: Examination of the right-lower field reveals " wheezing. Examination of the left-lower field reveals wheezing. Wheezing present.   Neurological:      Mental Status: She is alert.         Assessment/Plan   Problem List Items Addressed This Visit             ICD-10-CM    Pharyngitis - Primary J02.9     Start Augmentin 3 times daily for 7 days  Risk/benefits/side effects discussed  Call for worsening symptoms  Prednisone 20 mg twice daily for 5 days  Humidifier at night  Honey/lemon tea daily  Over-the-counter Chloraseptic spray for any sore throat  Can use Tylenol for pain         Relevant Medications    amoxicillin-pot clavulanate (Augmentin) 500-125 mg tablet    predniSONE (Deltasone) 20 mg tablet    Laryngitis acute, spasmodic J04.0     Start Augmentin 3 times daily for 7 days  Risk/benefits/side effects discussed  Call for worsening symptoms  Prednisone 20 mg twice daily for 5 days         Relevant Medications    amoxicillin-pot clavulanate (Augmentin) 500-125 mg tablet    predniSONE (Deltasone) 20 mg tablet

## 2024-03-25 NOTE — ASSESSMENT & PLAN NOTE
Start Augmentin 3 times daily for 7 days  Risk/benefits/side effects discussed  Call for worsening symptoms  Prednisone 20 mg twice daily for 5 days  Humidifier at night  Honey/lemon tea daily  Over-the-counter Chloraseptic spray for any sore throat  Can use Tylenol for pain

## 2024-03-25 NOTE — ASSESSMENT & PLAN NOTE
Start Augmentin 3 times daily for 7 days  Risk/benefits/side effects discussed  Call for worsening symptoms  Prednisone 20 mg twice daily for 5 days

## 2024-04-23 NOTE — PROGRESS NOTES
[facility] [place]  Outpatient Clinic Note    Patient: Penny Rosa  Age/Sex: 69 y.o., female  MRN: [id number]  Referred by: self     Chief Complaint:  urinary incontinence          History of Present Illness  This is a 69 y.o. female,  who presents as a new patient to the clinic for mixed urinary incontinence that has been going on for 2 years. She stated she can not hold her urine when needing to use the bathroom and she will have an accident. She happened twice on vacation and she would like to receive treatment for this. She has urinary urgency and frequency. The patient stated UUI is more bothersome to her than JOSE.  She denies dysuria, gross hematuria, flank pain, pelvic pain, vaginal bulging, fever or chills. The patient stated her bowel movements are normal and daily. She is not sexually active by choice she denies pain or vaginal dryness. She has a history of a hysterectomy for cancer of the uterine in  or 2017. She went through radiation for this as well. She has been in remission ever since. She had one . She feel like she has hot flashes, to the point her back of her neck is drenched in sweat. This happens at night and during the day.      Gyn History:  - Menopausal: No           Postmenopausal bleeding: No  - Hysterectomy: Yes - uterine cancer in  or   - Sexually active:  No  Dyspareunia: No   Other issues: none   - Number of prior vaginal deliveries: 0  - Number of prior c-sections: 1    Past Medical & Surgical History  Past Medical History:   Diagnosis Date    Personal history of malignant neoplasm of other parts of uterus     History of malignant neoplasm of uterus    Personal history of other diseases of the circulatory system     History of hypertension    Personal history of other diseases of the musculoskeletal system and connective tissue     History of arthritis    Personal history of other diseases of the musculoskeletal system and connective tissue     History  of rheumatoid arthritis    Personal history of other specified conditions     History of insomnia    Unspecified osteoarthritis, unspecified site     DJD (degenerative joint disease)     Past Surgical History:   Procedure Laterality Date    OTHER SURGICAL HISTORY  10/23/2019    Tubal ligation    OTHER SURGICAL HISTORY  10/23/2019    Dilation and curettage    OTHER SURGICAL HISTORY  10/23/2019    Total hysterectomy with removal of both tubes and ovaries    OTHER SURGICAL HISTORY  10/23/2019    Hip surgery    OTHER SURGICAL HISTORY  10/23/2019    Knee replacement    OTHER SURGICAL HISTORY  02/17/2020    Parathyroidectomy       Family History  Family History   Problem Relation Name Age of Onset    Hypertension Father      Cancer Father      Diabetes type II Father         Social History  She reports that she has never smoked. She has never used smokeless tobacco. She reports that she does not drink alcohol and does not use drugs.    Allergies  Patient has no known allergies.    Medications:  Current Outpatient Medications on File Prior to Visit   Medication Sig Dispense Refill    citalopram (CeleXA) 20 mg tablet Take 1 tablet (20 mg) by mouth once daily.      citalopram (CeleXA) 40 mg tablet Take 1 tablet (40 mg) by mouth once daily. 90 tablet 1    folic acid/vit B complex and C (B complex-vitamin C-folic acid) 400 mcg tablet extended release Take 1 tablet by mouth once daily.      leflunomide (Arava) 20 mg tablet Take 1 tablet (20 mg) by mouth once daily. 90 tablet 0    lisinopriL-hydrochlorothiazide 20-12.5 mg tablet Take 1 tablet by mouth once daily. 90 tablet 1    metoprolol tartrate (Lopressor) 25 mg tablet Take 1 tablet (25 mg) by mouth once daily. 90 tablet 1    naproxen sodium (Aleve) 220 mg tablet Take 1 tablet (220 mg) by mouth twice a day.      rosuvastatin (Crestor) 10 mg tablet Take 1 tablet (10 mg) by mouth once daily at bedtime. 90 tablet 1    VITAMIN B COMPLEX ORAL Take 1 tablet by mouth once daily.        No current facility-administered medications on file prior to visit.        Review of Systems   A comprehensive 10+ review of systems was negative except for: see hpi          Physical Exam                                                                                                                      General: Well developed, well nourished, alert and cooperative, appears in no acute distress  Head: Normocephalic, atraumatic  Neck: supple, trachea midline  Eyes: Non-injected conjunctiva, sclera clear, no proptosis  Cardiac: Extremities are warm and well perfused. No edema, cyanosis or pallor.   Lungs: Breathing is easy, non-labored. Speaking in clear and complete sentences. Normal diaphragmatic movement.  Abdomen: soft, non-distended, non-tender, no rebound or guarding, no hernia and no CVA tenderness   MSK: Ambulatory with steady gait, unassisted  Neuro: alert and oriented to person, place and time  Psych: Demonstrates good judgement and reason, without hallucinations, abnormal affect or abnormal behaviors.  Skin: no obvious lesions, no rashes    PVR (by Ultrasound): 105mL   Urine dip:   Recent Results (from the past 6 hour(s))   POCT UA Automated manually resulted    Collection Time: 24  9:15 AM   Result Value Ref Range    POC Glucose, Urine NEGATIVE NEGATIVE mg/dl    POC Bilirubin, Urine NEGATIVE NEGATIVE    POC Ketones, Urine NEGATIVE NEGATIVE mg/dl    POC Specific Gravity, Urine 1.020 1.005 - 1.035    POC Blood, Urine NEGATIVE NEGATIVE    POC PH, Urine 6.5 No Reference Range Established PH    POC Protein, Urine NEGATIVE NEGATIVE, 30 (1+) mg/dl    POC Urobilinogen, Urine 0.2 0.2, 1.0 EU/DL    Poc Nitrite, Urine NEGATIVE NEGATIVE    POC Leukocytes, Urine NEGATIVE NEGATIVE       Labs  N/A    Imaging  N/A      IMPRESSION AND PLAN:  Penny TIMMY Rosa is a 69 y.o.  who presents as a new patient to the clinic for mixed urinary incontinence that has been going on for 2 years. She stated she can  not hold her urine when needing to use the bathroom and she will have an accident. She happened twice on vacation and she would like to receive treatment for this. She has urinary urgency and frequency. The patient stated UUI is more bothersome to her than JOSE.  She denies dysuria, gross hematuria, flank pain, pelvic pain, vaginal bulging, fever or chills. The patient stated her bowel movements are normal and daily. She is not sexually active by choice she denies pain or vaginal dryness. She has a history of a hysterectomy for cancer of the uterine in 2016 or 2017. She went through radiation for this as well. She has been in remission ever since. She had one . She feel like she has hot flashes, to the point her back of her neck is drenched in sweat. This happens at night and during the day.     SHAN: Urge dominant  -discussed mechanism of UUI and JOSE, and treatment options for both including PFT, pessary, sling for JOSE and PFT, pharmacotherapy and third-line therapy for OAB  -Bulkamid is associated with slightly less success rate of a sling about 60 to 70% of women having >90% improvement. However, there seems to be similar long-term success compared to sling with fewer side-effects. Main AE is urinary retention which resolves within 24 hours of using a 10-12 Armenian catheter.  I discussed that if she still has some leakage after her procedure, she could perform another injection within 4 weeks and this procedure being performed in the office.   -We discussed the sling procedure in depth with her there is a long-term success rate of 70-80% complete continence and up to 90% significant improvement up to 10 years after surgery, though the sling is meant to last a lifetime, there is a <5% risk of subsequent surgery, either revision or excision within 9 years. The major complications include bladder perforation with sling placement <1%, retention requiring sling lysis 1-3%, transient retention requiring 2-3 days  of catheter drainage 33%, and mesh erosion 1-3%.   -Education handouts given to patient on treatment options    OAB  -Start Oxybutynin  -We discussed botox vs sacral neuromodulation: both have similar efficacy 80% patients reports >50% improvement, botox associated with 5% risk of incomplete emptying, increase in UTI and will require re-injection in 6-9 months; and as early as 3 months. SNM is a staged procedure, 2 weeks apart, consisting first of lead implantation then internalization of IPG if there is improvement. Interstim is associated with lead migration, explantation, infection and bleeding, though risks are all <5%. We also discussed PTNS which is associated with success rates comparable to medical therapy but without side-effects without significant major morbidity.   -Education handouts given to patient on treatment options    Vasomotor symptoms  -Start Oxybutynin    Follow up 3 months    All questions and concerns were answered and addressed.  The patient expressed understanding and agrees with the plan.     Reviewed and approved by COURT CALDERON on 4/24/24 at 9:33 AM.

## 2024-04-24 ENCOUNTER — OFFICE VISIT (OUTPATIENT)
Dept: UROLOGY | Facility: CLINIC | Age: 69
End: 2024-04-24
Payer: MEDICARE

## 2024-04-24 VITALS — DIASTOLIC BLOOD PRESSURE: 94 MMHG | SYSTOLIC BLOOD PRESSURE: 169 MMHG

## 2024-04-24 DIAGNOSIS — N39.46 MIXED STRESS AND URGE URINARY INCONTINENCE: Primary | ICD-10-CM

## 2024-04-24 DIAGNOSIS — N32.81 OAB (OVERACTIVE BLADDER): ICD-10-CM

## 2024-04-24 DIAGNOSIS — N95.1 VASOMOTOR SYMPTOMS DUE TO MENOPAUSE: ICD-10-CM

## 2024-04-24 LAB
POC BILIRUBIN, URINE: NEGATIVE
POC BLOOD, URINE: NEGATIVE
POC GLUCOSE, URINE: NEGATIVE MG/DL
POC KETONES, URINE: NEGATIVE MG/DL
POC LEUKOCYTES, URINE: NEGATIVE
POC NITRITE,URINE: NEGATIVE
POC PH, URINE: 6.5 PH
POC PROTEIN, URINE: NEGATIVE MG/DL
POC SPECIFIC GRAVITY, URINE: 1.02
POC UROBILINOGEN, URINE: 0.2 EU/DL

## 2024-04-24 PROCEDURE — 1123F ACP DISCUSS/DSCN MKR DOCD: CPT

## 2024-04-24 PROCEDURE — 2000F BLOOD PRESSURE MEASURE: CPT

## 2024-04-24 PROCEDURE — 3077F SYST BP >= 140 MM HG: CPT

## 2024-04-24 PROCEDURE — 1160F RVW MEDS BY RX/DR IN RCRD: CPT

## 2024-04-24 PROCEDURE — 3080F DIAST BP >= 90 MM HG: CPT

## 2024-04-24 PROCEDURE — G2211 COMPLEX E/M VISIT ADD ON: HCPCS

## 2024-04-24 PROCEDURE — 1159F MED LIST DOCD IN RCRD: CPT

## 2024-04-24 PROCEDURE — 3008F BODY MASS INDEX DOCD: CPT

## 2024-04-24 PROCEDURE — 1036F TOBACCO NON-USER: CPT

## 2024-04-24 PROCEDURE — 99204 OFFICE O/P NEW MOD 45 MIN: CPT

## 2024-04-24 PROCEDURE — 51798 US URINE CAPACITY MEASURE: CPT

## 2024-04-24 PROCEDURE — 81003 URINALYSIS AUTO W/O SCOPE: CPT

## 2024-04-24 RX ORDER — OXYBUTYNIN CHLORIDE 5 MG/1
TABLET, EXTENDED RELEASE ORAL
Qty: 60 TABLET | Refills: 11 | Status: SHIPPED | OUTPATIENT
Start: 2024-04-24

## 2024-04-30 DIAGNOSIS — M05.79 RHEUMATOID ARTHRITIS INVOLVING MULTIPLE SITES WITH POSITIVE RHEUMATOID FACTOR (MULTI): Primary | ICD-10-CM

## 2024-05-08 ENCOUNTER — APPOINTMENT (OUTPATIENT)
Dept: RHEUMATOLOGY | Facility: CLINIC | Age: 69
End: 2024-05-08
Payer: MEDICARE

## 2024-05-09 ENCOUNTER — APPOINTMENT (OUTPATIENT)
Dept: INFUSION THERAPY | Facility: HOSPITAL | Age: 69
End: 2024-05-09
Payer: MEDICARE

## 2024-05-15 ENCOUNTER — OFFICE VISIT (OUTPATIENT)
Dept: RHEUMATOLOGY | Facility: CLINIC | Age: 69
End: 2024-05-15
Payer: MEDICARE

## 2024-05-15 DIAGNOSIS — M05.79 RHEUMATOID ARTHRITIS INVOLVING MULTIPLE SITES WITH POSITIVE RHEUMATOID FACTOR (MULTI): Primary | ICD-10-CM

## 2024-05-15 PROCEDURE — 1160F RVW MEDS BY RX/DR IN RCRD: CPT | Performed by: INTERNAL MEDICINE

## 2024-05-15 PROCEDURE — 1123F ACP DISCUSS/DSCN MKR DOCD: CPT | Performed by: INTERNAL MEDICINE

## 2024-05-15 PROCEDURE — 3008F BODY MASS INDEX DOCD: CPT | Performed by: INTERNAL MEDICINE

## 2024-05-15 PROCEDURE — 99213 OFFICE O/P EST LOW 20 MIN: CPT | Performed by: INTERNAL MEDICINE

## 2024-05-15 PROCEDURE — 1036F TOBACCO NON-USER: CPT | Performed by: INTERNAL MEDICINE

## 2024-05-15 PROCEDURE — 1159F MED LIST DOCD IN RCRD: CPT | Performed by: INTERNAL MEDICINE

## 2024-05-15 RX ORDER — METHYLPREDNISOLONE 4 MG/1
TABLET ORAL
Qty: 21 TABLET | Refills: 0 | Status: SHIPPED | OUTPATIENT
Start: 2024-05-15 | End: 2024-05-22

## 2024-05-15 NOTE — PROGRESS NOTES
"Subjective  . Penny Rosa is a 69 y.o. female who presents for Follow-up.    HPI. 68-year-old female with history of seropositive RA, OA s/p bilateral TKA and right CRISTHIAN, s/p right shoulder surgery, endometrial CA s/p BENJAMIN/BSO (2017), HTN and obesity presented for follow-up.     She developed pain and swelling of the right hand.  She reports persistent pain in her left elbow.  Has chronic deformities in her hands.     Immunosuppression: Leflunomide.    Past immunosuppressive therapy:  1. MTX.  2. Humira.  3. Enbrel  4. Actemra. 7/2019- 8/2021. Resumed in July 2022.   5. Prednisone.    Review of Systems   All other systems reviewed and are negative.    Objective       10/26/2023     8:42 AM 11/6/2023     3:25 PM 1/5/2024     9:29 AM 1/5/2024    10:12 AM 2/5/2024     3:54 PM 3/25/2024     3:42 PM 4/24/2024     9:16 AM   Vitals   Systolic 143  144 128 131 171 169   Diastolic 85  77 80 77 81 94   Heart Rate 60  61 69  58    Temp 36.2 °C (97.2 °F)  36 °C (96.8 °F)   36.6 °C (97.9 °F)    Resp 16  18   18    Height (in) 1.598 m (5' 2.91\")   1.6 m (5' 3\")   1.6 m (5' 3\")    Weight (lb) 259.26 260 258  253 253    BMI 46.05 kg/m2 46.18 kg/m2 45.7 kg/m2  44.82 kg/m2 44.82 kg/m2    BSA (m2) 2.29 m2 2.29 m2 2.28 m2  2.26 m2 2.26 m2    Visit Report  Report Report Report Report Report Report       Significant value      Physical Exam.  Gen. AAO x3, NAD.  HEENT: No pallor or icterus, PERRLA, EOMI.   Skin: No rashes.  Heart: S1, S2/ RRR.   Lungs: CTA B.  Abdomen: Soft, NT/ND.  MSK: Right second and third MCP with welling and tenderness.  Chronic synovial proliferation of the MCP joint with decreased range of motion and subluxation of the MCP joints of the right hand.  She has bilateral swan-neck and boutonniere deformities.  Bilateral wrist with severely decreased range of motion.  Left elbow with diffuse tenderness and decreased range of motion.  Neuro:  Sensation to touch intact.Strength 5/5 throughout.   Psych:Appropriate " mood and behavior  EXT: No edema    Assessment/Plan  . 68-year-old female with history of seropositive RA, OA s/p bilateral TKA and right CRISTHIAN, s/p right shoulder surgery, endometrial CA s/p BENJAMIN/BSO (2017), HTN and obesity presented for follow-up.     #1: Seropositive RA.  Active.  She has chronic deformities as aforementioned.  She was doing well on Actemra however insurance denied it.  She has been approved through assistance program.  Explained to the patient that infusion center needs to call the pharmaceutical pharmacy for shipment.  -Medrol Dosepak.  -Continue leflunomide 20 mg daily.  -Labs.    #2: Osteopenia.  -Continue calcium and vitamin D.  -Obtain DEXA scan in December 2024.    Follow-up in 3 months.     This note was partially generated using the Dragon Voice recognition system. There may be some incorrect wording, spelling and/or spelling errors or punctuation errors that were not corrected prior to committing the note to the medical record.      Problem List Items Addressed This Visit       Rheumatoid arthritis involving multiple sites with positive rheumatoid factor (Multi) - Primary    Relevant Medications    methylPREDNISolone (Medrol Dospak) 4 mg tablets    Other Relevant Orders    CBC    Comprehensive Metabolic Panel    C-reactive protein    T-Spot TB    Sedimentation Rate            Active Ambulatory Problems     Diagnosis Date Noted    Acute bronchitis 04/27/2023    Adenomatous polyp of sigmoid colon 04/27/2023    Adenomatous polyps 04/27/2023    Ankle swelling 04/27/2023    Arthritis of left elbow 04/27/2023    Chronic diarrhea 04/27/2023    Depression 04/27/2023    Essential hypertension 04/27/2023    Frequency-urgency syndrome 04/27/2023    Overactive bladder 04/27/2023    Hypercalcemia 04/27/2023    Injury of left knee 04/27/2023    Injury of toe on right foot 04/27/2023    Lipoma of right lower extremity 04/27/2023    Lumbago 04/27/2023    Mixed hyperlipidemia 04/27/2023    Nocturia  04/27/2023    Osteopenia of multiple sites 04/27/2023    Primary hyperparathyroidism (Multi) 04/27/2023    Recurrent major depressive disorder, in full remission ([facility]) 04/27/2023    Shoulder arthritis 04/27/2023    Right foot strain, initial encounter 04/27/2023    Right shoulder pain 04/27/2023    Shoulder impingement, right 04/27/2023    Sore throat 04/27/2023    Status post total replacement of right shoulder 04/27/2023    Strain of left knee and leg 04/27/2023    Tonsillitis 04/27/2023    Tubular adenoma of colon 04/27/2023    Yeast infection of the skin 04/27/2023    Bilateral hearing loss due to cerumen impaction 06/30/2023    B12 deficiency 06/30/2023    Impaired fasting blood sugar 06/30/2023    Vitamin D deficiency 06/30/2023    Endometrial cancer (Multi) 10/22/2023    H/O: hysterectomy 10/22/2023    Urge incontinence 10/22/2023    Primary localized osteoarthritis of left pelvic region and thigh 06/01/2010    Morbid obesity with BMI of 40.0-44.9, adult (Multi) 10/22/2023    Rheumatoid arthritis involving multiple sites with positive rheumatoid factor (Multi) 10/25/2023    Pharyngitis 03/25/2024    Laryngitis acute, spasmodic 03/25/2024     Resolved Ambulatory Problems     Diagnosis Date Noted    Rheumatoid arthritis (Multi) 04/27/2023     Past Medical History:   Diagnosis Date    Personal history of malignant neoplasm of other parts of uterus     Personal history of other diseases of the circulatory system     Personal history of other diseases of the musculoskeletal system and connective tissue     Personal history of other diseases of the musculoskeletal system and connective tissue     Personal history of other specified conditions     Unspecified osteoarthritis, unspecified site        Family History   Problem Relation Name Age of Onset    Hypertension Father      Cancer Father      Diabetes type II Father         Past Surgical History:   Procedure Laterality Date    OTHER SURGICAL HISTORY   10/23/2019    Tubal ligation    OTHER SURGICAL HISTORY  10/23/2019    Dilation and curettage    OTHER SURGICAL HISTORY  10/23/2019    Total hysterectomy with removal of both tubes and ovaries    OTHER SURGICAL HISTORY  10/23/2019    Hip surgery    OTHER SURGICAL HISTORY  10/23/2019    Knee replacement    OTHER SURGICAL HISTORY  02/17/2020    Parathyroidectomy       Social History     Tobacco Use   Smoking Status Never   Smokeless Tobacco Never       Allergies  Patient has no known allergies.    Current Meds  Current Outpatient Medications   Medication Instructions    citalopram (CELEXA) 20 mg, oral, Daily    citalopram (CELEXA) 40 mg, oral, Daily    folic acid/vit B complex and C (B complex-vitamin C-folic acid) 400 mcg tablet extended release 1 tablet, oral, Daily    leflunomide (ARAVA) 20 mg, oral, Daily    lisinopriL-hydrochlorothiazide 20-12.5 mg tablet 1 tablet, oral, Daily    methylPREDNISolone (Medrol Dospak) 4 mg tablets Follow schedule on package instructions    metoprolol tartrate (LOPRESSOR) 25 mg, oral, Daily    naproxen sodium (ALEVE) 220 mg, oral, 2 times daily    oxybutynin XL (Ditropan-XL) 5 mg 24 hr tablet Take 1 tablet in am and 1 at night    rosuvastatin (CRESTOR) 10 mg, oral, Nightly    VITAMIN B COMPLEX ORAL 1 tablet, oral, Daily RT        Lab Results   Component Value Date    RF 16 (H) 04/05/2022    SEDRATE 26 02/15/2024    CRP 0.23 02/15/2024    URICACID 4.2 02/15/2024        Jerel Kolb MD

## 2024-05-15 NOTE — PATIENT INSTRUCTIONS
Continue leflunomide 20 mg daily.  Take Medrol Dosepak.  Call if any question.  Follow-up in 3 months.

## 2024-05-17 ENCOUNTER — LAB (OUTPATIENT)
Dept: LAB | Facility: LAB | Age: 69
End: 2024-05-17
Payer: MEDICARE

## 2024-05-17 DIAGNOSIS — M05.79 RHEUMATOID ARTHRITIS INVOLVING MULTIPLE SITES WITH POSITIVE RHEUMATOID FACTOR (MULTI): ICD-10-CM

## 2024-05-17 LAB
ALBUMIN SERPL BCP-MCNC: 4.1 G/DL (ref 3.4–5)
ALP SERPL-CCNC: 79 U/L (ref 33–136)
ALT SERPL W P-5'-P-CCNC: 14 U/L (ref 7–45)
ANION GAP SERPL CALC-SCNC: 13 MMOL/L (ref 10–20)
AST SERPL W P-5'-P-CCNC: 16 U/L (ref 9–39)
BASOPHILS # BLD AUTO: 0.11 X10*3/UL (ref 0–0.1)
BASOPHILS NFR BLD AUTO: 1.3 %
BILIRUB SERPL-MCNC: 0.5 MG/DL (ref 0–1.2)
BUN SERPL-MCNC: 22 MG/DL (ref 6–23)
CALCIUM SERPL-MCNC: 9.5 MG/DL (ref 8.6–10.3)
CHLORIDE SERPL-SCNC: 104 MMOL/L (ref 98–107)
CO2 SERPL-SCNC: 25 MMOL/L (ref 21–32)
CREAT SERPL-MCNC: 0.94 MG/DL (ref 0.5–1.05)
CRP SERPL-MCNC: 0.13 MG/DL
EGFRCR SERPLBLD CKD-EPI 2021: 66 ML/MIN/1.73M*2
EOSINOPHIL # BLD AUTO: 0.11 X10*3/UL (ref 0–0.7)
EOSINOPHIL NFR BLD AUTO: 1.3 %
ERYTHROCYTE [DISTWIDTH] IN BLOOD BY AUTOMATED COUNT: 14.8 % (ref 11.5–14.5)
ERYTHROCYTE [SEDIMENTATION RATE] IN BLOOD BY WESTERGREN METHOD: 26 MM/H (ref 0–30)
GLUCOSE SERPL-MCNC: 97 MG/DL (ref 74–99)
HCT VFR BLD AUTO: 41.4 % (ref 36–46)
HGB BLD-MCNC: 13.4 G/DL (ref 12–16)
IMM GRANULOCYTES # BLD AUTO: 0.03 X10*3/UL (ref 0–0.7)
IMM GRANULOCYTES NFR BLD AUTO: 0.3 % (ref 0–0.9)
LYMPHOCYTES # BLD AUTO: 1.25 X10*3/UL (ref 1.2–4.8)
LYMPHOCYTES NFR BLD AUTO: 14.3 %
MCH RBC QN AUTO: 29.1 PG (ref 26–34)
MCHC RBC AUTO-ENTMCNC: 32.4 G/DL (ref 32–36)
MCV RBC AUTO: 90 FL (ref 80–100)
MONOCYTES # BLD AUTO: 0.64 X10*3/UL (ref 0.1–1)
MONOCYTES NFR BLD AUTO: 7.3 %
NEUTROPHILS # BLD AUTO: 6.6 X10*3/UL (ref 1.2–7.7)
NEUTROPHILS NFR BLD AUTO: 75.5 %
NRBC BLD-RTO: 0 /100 WBCS (ref 0–0)
PLATELET # BLD AUTO: 330 X10*3/UL (ref 150–450)
POTASSIUM SERPL-SCNC: 4.2 MMOL/L (ref 3.5–5.3)
PROT SERPL-MCNC: 6.4 G/DL (ref 6.4–8.2)
RBC # BLD AUTO: 4.61 X10*6/UL (ref 4–5.2)
SODIUM SERPL-SCNC: 138 MMOL/L (ref 136–145)
WBC # BLD AUTO: 8.7 X10*3/UL (ref 4.4–11.3)

## 2024-05-17 PROCEDURE — 85652 RBC SED RATE AUTOMATED: CPT

## 2024-05-17 PROCEDURE — 85025 COMPLETE CBC W/AUTO DIFF WBC: CPT

## 2024-05-17 PROCEDURE — 36415 COLL VENOUS BLD VENIPUNCTURE: CPT

## 2024-05-17 PROCEDURE — 86481 TB AG RESPONSE T-CELL SUSP: CPT

## 2024-05-17 PROCEDURE — 80053 COMPREHEN METABOLIC PANEL: CPT

## 2024-05-17 PROCEDURE — 86140 C-REACTIVE PROTEIN: CPT

## 2024-05-20 ENCOUNTER — APPOINTMENT (OUTPATIENT)
Dept: INFUSION THERAPY | Facility: HOSPITAL | Age: 69
End: 2024-05-20
Payer: MEDICARE

## 2024-05-24 ENCOUNTER — INFUSION (OUTPATIENT)
Dept: INFUSION THERAPY | Facility: HOSPITAL | Age: 69
End: 2024-05-24
Payer: MEDICARE

## 2024-05-24 VITALS
OXYGEN SATURATION: 96 % | TEMPERATURE: 98 F | SYSTOLIC BLOOD PRESSURE: 121 MMHG | HEART RATE: 52 BPM | DIASTOLIC BLOOD PRESSURE: 76 MMHG | WEIGHT: 250.88 LBS | RESPIRATION RATE: 18 BRPM | BODY MASS INDEX: 44.44 KG/M2

## 2024-05-24 DIAGNOSIS — M05.79 RHEUMATOID ARTHRITIS INVOLVING MULTIPLE SITES WITH POSITIVE RHEUMATOID FACTOR (MULTI): ICD-10-CM

## 2024-05-24 PROCEDURE — 96365 THER/PROPH/DIAG IV INF INIT: CPT | Mod: INF

## 2024-05-24 PROCEDURE — 2500000004 HC RX 250 GENERAL PHARMACY W/ HCPCS (ALT 636 FOR OP/ED): Performed by: INTERNAL MEDICINE

## 2024-05-24 RX ORDER — HEPARIN SODIUM,PORCINE/PF 10 UNIT/ML
50 SYRINGE (ML) INTRAVENOUS AS NEEDED
OUTPATIENT
Start: 2024-05-24

## 2024-05-24 RX ORDER — FAMOTIDINE 10 MG/ML
20 INJECTION INTRAVENOUS ONCE AS NEEDED
OUTPATIENT
Start: 2024-06-05

## 2024-05-24 RX ORDER — EPINEPHRINE 0.3 MG/.3ML
0.3 INJECTION SUBCUTANEOUS EVERY 5 MIN PRN
OUTPATIENT
Start: 2024-06-05

## 2024-05-24 RX ORDER — NEOMYCIN SULFATE, POLYMYXIN B SULFATE AND DEXAMETHASONE 3.5; 10000; 1 MG/ML; [USP'U]/ML; MG/ML
SUSPENSION/ DROPS OPHTHALMIC
COMMUNITY
Start: 2024-05-15

## 2024-05-24 RX ORDER — DIPHENHYDRAMINE HYDROCHLORIDE 50 MG/ML
50 INJECTION INTRAMUSCULAR; INTRAVENOUS AS NEEDED
OUTPATIENT
Start: 2024-06-05

## 2024-05-24 RX ORDER — ALBUTEROL SULFATE 0.83 MG/ML
3 SOLUTION RESPIRATORY (INHALATION) AS NEEDED
OUTPATIENT
Start: 2024-06-05

## 2024-05-24 RX ADMIN — TOCILIZUMAB 456 MG: 20 INJECTION, SOLUTION, CONCENTRATE INTRAVENOUS at 14:07

## 2024-05-24 ASSESSMENT — COLUMBIA-SUICIDE SEVERITY RATING SCALE - C-SSRS
1. IN THE PAST MONTH, HAVE YOU WISHED YOU WERE DEAD OR WISHED YOU COULD GO TO SLEEP AND NOT WAKE UP?: NO
2. HAVE YOU ACTUALLY HAD ANY THOUGHTS OF KILLING YOURSELF?: NO
2. HAVE YOU ACTUALLY HAD ANY THOUGHTS OF KILLING YOURSELF?: NO
6. HAVE YOU EVER DONE ANYTHING, STARTED TO DO ANYTHING, OR PREPARED TO DO ANYTHING TO END YOUR LIFE?: NO
6. HAVE YOU EVER DONE ANYTHING, STARTED TO DO ANYTHING, OR PREPARED TO DO ANYTHING TO END YOUR LIFE?: NO
1. IN THE PAST MONTH, HAVE YOU WISHED YOU WERE DEAD OR WISHED YOU COULD GO TO SLEEP AND NOT WAKE UP?: NO

## 2024-05-24 ASSESSMENT — PATIENT HEALTH QUESTIONNAIRE - PHQ9
SUM OF ALL RESPONSES TO PHQ9 QUESTIONS 1 AND 2: 0
1. LITTLE INTEREST OR PLEASURE IN DOING THINGS: NOT AT ALL
2. FEELING DOWN, DEPRESSED OR HOPELESS: NOT AT ALL
1. LITTLE INTEREST OR PLEASURE IN DOING THINGS: NOT AT ALL
SUM OF ALL RESPONSES TO PHQ9 QUESTIONS 1 AND 2: 0
2. FEELING DOWN, DEPRESSED OR HOPELESS: NOT AT ALL

## 2024-05-24 ASSESSMENT — ENCOUNTER SYMPTOMS
OCCASIONAL FEELINGS OF UNSTEADINESS: 0
DEPRESSION: 0
LOSS OF SENSATION IN FEET: 0

## 2024-05-24 ASSESSMENT — PAIN SCALES - GENERAL: PAINLEVEL: 0-NO PAIN

## 2024-06-21 ENCOUNTER — INFUSION (OUTPATIENT)
Dept: INFUSION THERAPY | Facility: HOSPITAL | Age: 69
End: 2024-06-21
Payer: MEDICARE

## 2024-06-21 VITALS
OXYGEN SATURATION: 96 % | WEIGHT: 253.53 LBS | DIASTOLIC BLOOD PRESSURE: 80 MMHG | SYSTOLIC BLOOD PRESSURE: 131 MMHG | RESPIRATION RATE: 18 BRPM | HEART RATE: 52 BPM | BODY MASS INDEX: 44.91 KG/M2

## 2024-06-21 DIAGNOSIS — M05.79 RHEUMATOID ARTHRITIS INVOLVING MULTIPLE SITES WITH POSITIVE RHEUMATOID FACTOR (MULTI): ICD-10-CM

## 2024-06-21 PROCEDURE — 96365 THER/PROPH/DIAG IV INF INIT: CPT | Mod: INF

## 2024-06-21 PROCEDURE — 2500000004 HC RX 250 GENERAL PHARMACY W/ HCPCS (ALT 636 FOR OP/ED): Performed by: INTERNAL MEDICINE

## 2024-06-21 RX ORDER — EPINEPHRINE 0.3 MG/.3ML
0.3 INJECTION SUBCUTANEOUS EVERY 5 MIN PRN
OUTPATIENT
Start: 2024-07-05

## 2024-06-21 RX ORDER — DIPHENHYDRAMINE HYDROCHLORIDE 50 MG/ML
50 INJECTION INTRAMUSCULAR; INTRAVENOUS AS NEEDED
OUTPATIENT
Start: 2024-07-05

## 2024-06-21 RX ORDER — HEPARIN SODIUM,PORCINE/PF 10 UNIT/ML
50 SYRINGE (ML) INTRAVENOUS AS NEEDED
OUTPATIENT
Start: 2024-06-21

## 2024-06-21 RX ORDER — ALBUTEROL SULFATE 0.83 MG/ML
3 SOLUTION RESPIRATORY (INHALATION) AS NEEDED
OUTPATIENT
Start: 2024-07-05

## 2024-06-21 RX ORDER — FAMOTIDINE 10 MG/ML
20 INJECTION INTRAVENOUS ONCE AS NEEDED
OUTPATIENT
Start: 2024-07-05

## 2024-06-21 ASSESSMENT — ENCOUNTER SYMPTOMS
LOSS OF SENSATION IN FEET: 0
DEPRESSION: 0
OCCASIONAL FEELINGS OF UNSTEADINESS: 0

## 2024-06-21 ASSESSMENT — PATIENT HEALTH QUESTIONNAIRE - PHQ9
2. FEELING DOWN, DEPRESSED OR HOPELESS: NOT AT ALL
1. LITTLE INTEREST OR PLEASURE IN DOING THINGS: NOT AT ALL
SUM OF ALL RESPONSES TO PHQ9 QUESTIONS 1 AND 2: 0

## 2024-06-21 ASSESSMENT — PAIN SCALES - GENERAL: PAINLEVEL: 0-NO PAIN

## 2024-06-26 ENCOUNTER — TELEPHONE (OUTPATIENT)
Dept: PRIMARY CARE | Facility: CLINIC | Age: 69
End: 2024-06-26
Payer: MEDICARE

## 2024-06-26 DIAGNOSIS — M06.00 RHEUMATOID ARTHRITIS WITH NEGATIVE RHEUMATOID FACTOR, INVOLVING UNSPECIFIED SITE (MULTI): ICD-10-CM

## 2024-06-26 RX ORDER — LEFLUNOMIDE 20 MG/1
20 TABLET ORAL DAILY
Qty: 60 TABLET | Refills: 0 | Status: SHIPPED | OUTPATIENT
Start: 2024-06-26 | End: 2024-08-25

## 2024-06-26 NOTE — TELEPHONE ENCOUNTER
Patient called for a refill on    Leflunomide 20 mg tablets take one tablet by mouth once daily    Lita Norwood [phone]

## 2024-06-28 ENCOUNTER — TELEPHONE (OUTPATIENT)
Dept: PRIMARY CARE | Facility: CLINIC | Age: 69
End: 2024-06-28
Payer: MEDICARE

## 2024-06-28 DIAGNOSIS — F33.1 MODERATE EPISODE OF RECURRENT MAJOR DEPRESSIVE DISORDER (MULTI): ICD-10-CM

## 2024-06-28 DIAGNOSIS — I10 ESSENTIAL HYPERTENSION: ICD-10-CM

## 2024-06-28 DIAGNOSIS — E78.2 MIXED HYPERLIPIDEMIA: ICD-10-CM

## 2024-06-28 RX ORDER — LISINOPRIL AND HYDROCHLOROTHIAZIDE 12.5; 2 MG/1; MG/1
1 TABLET ORAL DAILY
Qty: 30 TABLET | Refills: 0 | Status: SHIPPED | OUTPATIENT
Start: 2024-06-28 | End: 2024-07-28

## 2024-06-28 RX ORDER — METOPROLOL TARTRATE 25 MG/1
25 TABLET, FILM COATED ORAL DAILY
Qty: 30 TABLET | Refills: 0 | Status: SHIPPED | OUTPATIENT
Start: 2024-06-28

## 2024-06-28 RX ORDER — CITALOPRAM 40 MG/1
40 TABLET, FILM COATED ORAL DAILY
Qty: 30 TABLET | Refills: 0 | Status: SHIPPED | OUTPATIENT
Start: 2024-06-28 | End: 2024-07-28

## 2024-06-28 RX ORDER — ROSUVASTATIN CALCIUM 10 MG/1
10 TABLET, COATED ORAL NIGHTLY
Qty: 30 TABLET | Refills: 0 | Status: SHIPPED | OUTPATIENT
Start: 2024-06-28 | End: 2024-07-28

## 2024-06-28 NOTE — TELEPHONE ENCOUNTER
Rx Refill Request Telephone Encounter    Name:  Penny Childsjosejimbo  :  [id number]  Medication Name:    rosuvastatin (Crestor) 10 mg tablet   Route: Take 1 tablet (10 mg) by mouth once daily at bedtime.     metoprolol tartrate (Lopressor) 25 mg tablet   Route: Take 1 tablet (25 mg) by mouth once daily.       lisinopriL-hydrochlorothiazide 20-12.5 mg tablet   Route: Take 1 tablet by mouth once daily.     citalopram (CeleXA) 40 mg tablet   Route: Take 1 tablet (40 mg) by mouth once daily    Specific Pharmacy location:  [id number] [address]   Date of last appointment:  3/25/24  Date of next appointment:  24  Best number to reach patient: [phone]

## 2024-07-12 ENCOUNTER — APPOINTMENT (OUTPATIENT)
Dept: PRIMARY CARE | Facility: CLINIC | Age: 69
End: 2024-07-12
Payer: MEDICARE

## 2024-07-12 VITALS
OXYGEN SATURATION: 94 % | DIASTOLIC BLOOD PRESSURE: 82 MMHG | SYSTOLIC BLOOD PRESSURE: 155 MMHG | TEMPERATURE: 97.8 F | WEIGHT: 253.6 LBS | HEIGHT: 63 IN | HEART RATE: 56 BPM | BODY MASS INDEX: 44.93 KG/M2 | RESPIRATION RATE: 18 BRPM

## 2024-07-12 DIAGNOSIS — I10 ESSENTIAL HYPERTENSION: ICD-10-CM

## 2024-07-12 DIAGNOSIS — F33.1 MODERATE EPISODE OF RECURRENT MAJOR DEPRESSIVE DISORDER (MULTI): ICD-10-CM

## 2024-07-12 DIAGNOSIS — E78.2 MIXED HYPERLIPIDEMIA: ICD-10-CM

## 2024-07-12 DIAGNOSIS — Z12.31 ENCOUNTER FOR SCREENING MAMMOGRAM FOR MALIGNANT NEOPLASM OF BREAST: ICD-10-CM

## 2024-07-12 RX ORDER — LISINOPRIL AND HYDROCHLOROTHIAZIDE 12.5; 2 MG/1; MG/1
1 TABLET ORAL DAILY
Qty: 30 TABLET | Refills: 5 | Status: CANCELLED | OUTPATIENT
Start: 2024-07-12 | End: 2025-01-08

## 2024-07-12 RX ORDER — CITALOPRAM 40 MG/1
40 TABLET, FILM COATED ORAL DAILY
Qty: 30 TABLET | Refills: 5 | Status: CANCELLED | OUTPATIENT
Start: 2024-07-12 | End: 2025-01-08

## 2024-07-12 RX ORDER — METOPROLOL TARTRATE 25 MG/1
25 TABLET, FILM COATED ORAL DAILY
Qty: 30 TABLET | Refills: 5 | Status: CANCELLED | OUTPATIENT
Start: 2024-07-12

## 2024-07-12 RX ORDER — ROSUVASTATIN CALCIUM 10 MG/1
10 TABLET, COATED ORAL NIGHTLY
Qty: 30 TABLET | Refills: 5 | OUTPATIENT
Start: 2024-07-12 | End: 2025-01-08

## 2024-07-16 ENCOUNTER — LAB (OUTPATIENT)
Dept: LAB | Facility: LAB | Age: 69
End: 2024-07-16
Payer: MEDICARE

## 2024-07-16 DIAGNOSIS — E53.8 B12 DEFICIENCY: ICD-10-CM

## 2024-07-16 DIAGNOSIS — Z13.29 THYROID DISORDER SCREEN: ICD-10-CM

## 2024-07-16 DIAGNOSIS — I10 ESSENTIAL HYPERTENSION: ICD-10-CM

## 2024-07-16 DIAGNOSIS — E55.9 VITAMIN D DEFICIENCY: ICD-10-CM

## 2024-07-16 DIAGNOSIS — E78.2 MIXED HYPERLIPIDEMIA: ICD-10-CM

## 2024-07-16 DIAGNOSIS — R73.01 IMPAIRED FASTING BLOOD SUGAR: ICD-10-CM

## 2024-07-16 LAB
ALBUMIN SERPL BCP-MCNC: 4.1 G/DL (ref 3.4–5)
ALP SERPL-CCNC: 57 U/L (ref 33–136)
ALT SERPL W P-5'-P-CCNC: 36 U/L (ref 7–45)
ANION GAP SERPL CALC-SCNC: 7 MMOL/L (ref 10–20)
AST SERPL W P-5'-P-CCNC: 28 U/L (ref 9–39)
BILIRUB SERPL-MCNC: 0.6 MG/DL (ref 0–1.2)
BUN SERPL-MCNC: 17 MG/DL (ref 6–23)
CALCIUM SERPL-MCNC: 9 MG/DL (ref 8.6–10.3)
CHLORIDE SERPL-SCNC: 111 MMOL/L (ref 98–107)
CHOLEST SERPL-MCNC: 141 MG/DL (ref 0–199)
CHOLESTEROL/HDL RATIO: 2.2
CO2 SERPL-SCNC: 27 MMOL/L (ref 21–32)
CREAT SERPL-MCNC: 0.8 MG/DL (ref 0.5–1.05)
EGFRCR SERPLBLD CKD-EPI 2021: 80 ML/MIN/1.73M*2
ERYTHROCYTE [DISTWIDTH] IN BLOOD BY AUTOMATED COUNT: 15.2 % (ref 11.5–14.5)
EST. AVERAGE GLUCOSE BLD GHB EST-MCNC: 108 MG/DL
GLUCOSE SERPL-MCNC: 96 MG/DL (ref 74–99)
HBA1C MFR BLD: 5.4 %
HCT VFR BLD AUTO: 43.8 % (ref 36–46)
HDLC SERPL-MCNC: 64 MG/DL
HGB BLD-MCNC: 14.3 G/DL (ref 12–16)
LDLC SERPL CALC-MCNC: 54 MG/DL
LDLC SERPL DIRECT ASSAY-MCNC: 27 MG/DL (ref 0–129)
MCH RBC QN AUTO: 29.3 PG (ref 26–34)
MCHC RBC AUTO-ENTMCNC: 32.6 G/DL (ref 32–36)
MCV RBC AUTO: 90 FL (ref 80–100)
NON HDL CHOLESTEROL: 77 MG/DL (ref 0–149)
NRBC BLD-RTO: 0 /100 WBCS (ref 0–0)
PLATELET # BLD AUTO: 195 X10*3/UL (ref 150–450)
POTASSIUM SERPL-SCNC: 4.1 MMOL/L (ref 3.5–5.3)
PROT SERPL-MCNC: 6.2 G/DL (ref 6.4–8.2)
RBC # BLD AUTO: 4.88 X10*6/UL (ref 4–5.2)
SODIUM SERPL-SCNC: 141 MMOL/L (ref 136–145)
TRIGL SERPL-MCNC: 113 MG/DL (ref 0–149)
TSH SERPL-ACNC: 1.21 MIU/L (ref 0.44–3.98)
VIT B12 SERPL-MCNC: 704 PG/ML (ref 211–911)
VLDL: 23 MG/DL (ref 0–40)
WBC # BLD AUTO: 3.7 X10*3/UL (ref 4.4–11.3)

## 2024-07-16 PROCEDURE — 80053 COMPREHEN METABOLIC PANEL: CPT

## 2024-07-16 PROCEDURE — 83721 ASSAY OF BLOOD LIPOPROTEIN: CPT

## 2024-07-16 PROCEDURE — 36415 COLL VENOUS BLD VENIPUNCTURE: CPT

## 2024-07-16 PROCEDURE — 82652 VIT D 1 25-DIHYDROXY: CPT

## 2024-07-16 PROCEDURE — 85027 COMPLETE CBC AUTOMATED: CPT

## 2024-07-16 PROCEDURE — 80061 LIPID PANEL: CPT

## 2024-07-19 ENCOUNTER — INFUSION (OUTPATIENT)
Dept: INFUSION THERAPY | Facility: HOSPITAL | Age: 69
End: 2024-07-19
Payer: MEDICARE

## 2024-07-19 VITALS
BODY MASS INDEX: 44.91 KG/M2 | SYSTOLIC BLOOD PRESSURE: 168 MMHG | RESPIRATION RATE: 20 BRPM | TEMPERATURE: 97.1 F | DIASTOLIC BLOOD PRESSURE: 88 MMHG | WEIGHT: 253.53 LBS | OXYGEN SATURATION: 95 % | HEART RATE: 50 BPM

## 2024-07-19 DIAGNOSIS — M05.79 RHEUMATOID ARTHRITIS INVOLVING MULTIPLE SITES WITH POSITIVE RHEUMATOID FACTOR (MULTI): ICD-10-CM

## 2024-07-19 LAB — 1,25(OH)2D SERPL-MCNC: 32.4 PG/ML (ref 19.9–79.3)

## 2024-07-19 PROCEDURE — 96365 THER/PROPH/DIAG IV INF INIT: CPT | Mod: INF

## 2024-07-19 PROCEDURE — 2500000004 HC RX 250 GENERAL PHARMACY W/ HCPCS (ALT 636 FOR OP/ED): Performed by: INTERNAL MEDICINE

## 2024-07-19 RX ORDER — EPINEPHRINE 0.3 MG/.3ML
0.3 INJECTION SUBCUTANEOUS EVERY 5 MIN PRN
OUTPATIENT
Start: 2024-08-05

## 2024-07-19 RX ORDER — FAMOTIDINE 10 MG/ML
20 INJECTION INTRAVENOUS ONCE AS NEEDED
OUTPATIENT
Start: 2024-08-05

## 2024-07-19 RX ORDER — ALBUTEROL SULFATE 0.83 MG/ML
3 SOLUTION RESPIRATORY (INHALATION) AS NEEDED
OUTPATIENT
Start: 2024-08-05

## 2024-07-19 RX ORDER — HEPARIN SODIUM,PORCINE/PF 10 UNIT/ML
50 SYRINGE (ML) INTRAVENOUS AS NEEDED
OUTPATIENT
Start: 2024-07-19

## 2024-07-19 RX ORDER — DIPHENHYDRAMINE HYDROCHLORIDE 50 MG/ML
50 INJECTION INTRAMUSCULAR; INTRAVENOUS AS NEEDED
OUTPATIENT
Start: 2024-08-05

## 2024-07-19 SDOH — ECONOMIC STABILITY: FOOD INSECURITY: WITHIN THE PAST 12 MONTHS, THE FOOD YOU BOUGHT JUST DIDN'T LAST AND YOU DIDN'T HAVE MONEY TO GET MORE.: NEVER TRUE

## 2024-07-19 SDOH — ECONOMIC STABILITY: FOOD INSECURITY: WITHIN THE PAST 12 MONTHS, YOU WORRIED THAT YOUR FOOD WOULD RUN OUT BEFORE YOU GOT MONEY TO BUY MORE.: NEVER TRUE

## 2024-07-19 ASSESSMENT — COLUMBIA-SUICIDE SEVERITY RATING SCALE - C-SSRS
1. IN THE PAST MONTH, HAVE YOU WISHED YOU WERE DEAD OR WISHED YOU COULD GO TO SLEEP AND NOT WAKE UP?: NO
2. HAVE YOU ACTUALLY HAD ANY THOUGHTS OF KILLING YOURSELF?: NO
6. HAVE YOU EVER DONE ANYTHING, STARTED TO DO ANYTHING, OR PREPARED TO DO ANYTHING TO END YOUR LIFE?: NO

## 2024-07-19 ASSESSMENT — ENCOUNTER SYMPTOMS
OCCASIONAL FEELINGS OF UNSTEADINESS: 0
LOSS OF SENSATION IN FEET: 1
DEPRESSION: 0

## 2024-07-26 ENCOUNTER — APPOINTMENT (OUTPATIENT)
Dept: PRIMARY CARE | Facility: CLINIC | Age: 69
End: 2024-07-26
Payer: MEDICARE

## 2024-07-26 VITALS
OXYGEN SATURATION: 95 % | RESPIRATION RATE: 18 BRPM | TEMPERATURE: 98.5 F | SYSTOLIC BLOOD PRESSURE: 156 MMHG | BODY MASS INDEX: 44.83 KG/M2 | WEIGHT: 253 LBS | DIASTOLIC BLOOD PRESSURE: 83 MMHG | HEIGHT: 63 IN | HEART RATE: 50 BPM

## 2024-07-26 DIAGNOSIS — E66.01 MORBID OBESITY WITH BMI OF 40.0-44.9, ADULT (MULTI): ICD-10-CM

## 2024-07-26 DIAGNOSIS — Z12.31 ENCOUNTER FOR SCREENING MAMMOGRAM FOR MALIGNANT NEOPLASM OF BREAST: ICD-10-CM

## 2024-07-26 DIAGNOSIS — F33.1 MODERATE EPISODE OF RECURRENT MAJOR DEPRESSIVE DISORDER (MULTI): ICD-10-CM

## 2024-07-26 DIAGNOSIS — E78.2 MIXED HYPERLIPIDEMIA: Primary | ICD-10-CM

## 2024-07-26 DIAGNOSIS — Z13.29 THYROID DISORDER SCREEN: ICD-10-CM

## 2024-07-26 DIAGNOSIS — M05.79 RHEUMATOID ARTHRITIS INVOLVING MULTIPLE SITES WITH POSITIVE RHEUMATOID FACTOR (MULTI): ICD-10-CM

## 2024-07-26 DIAGNOSIS — E55.9 VITAMIN D DEFICIENCY: ICD-10-CM

## 2024-07-26 DIAGNOSIS — I10 ESSENTIAL HYPERTENSION: ICD-10-CM

## 2024-07-26 DIAGNOSIS — E53.8 B12 DEFICIENCY: ICD-10-CM

## 2024-07-26 DIAGNOSIS — E53.8 VITAMIN B12 DEFICIENCY: ICD-10-CM

## 2024-07-26 PROCEDURE — 1123F ACP DISCUSS/DSCN MKR DOCD: CPT | Performed by: NURSE PRACTITIONER

## 2024-07-26 PROCEDURE — 3077F SYST BP >= 140 MM HG: CPT | Performed by: NURSE PRACTITIONER

## 2024-07-26 PROCEDURE — 99214 OFFICE O/P EST MOD 30 MIN: CPT | Performed by: NURSE PRACTITIONER

## 2024-07-26 PROCEDURE — 1159F MED LIST DOCD IN RCRD: CPT | Performed by: NURSE PRACTITIONER

## 2024-07-26 PROCEDURE — 3079F DIAST BP 80-89 MM HG: CPT | Performed by: NURSE PRACTITIONER

## 2024-07-26 PROCEDURE — 1036F TOBACCO NON-USER: CPT | Performed by: NURSE PRACTITIONER

## 2024-07-26 PROCEDURE — 3008F BODY MASS INDEX DOCD: CPT | Performed by: NURSE PRACTITIONER

## 2024-07-26 PROCEDURE — 1160F RVW MEDS BY RX/DR IN RCRD: CPT | Performed by: NURSE PRACTITIONER

## 2024-07-26 RX ORDER — METOPROLOL TARTRATE 25 MG/1
25 TABLET, FILM COATED ORAL DAILY
Qty: 90 TABLET | Refills: 1 | Status: SHIPPED | OUTPATIENT
Start: 2024-07-26

## 2024-07-26 RX ORDER — CITALOPRAM 40 MG/1
40 TABLET, FILM COATED ORAL DAILY
Qty: 90 TABLET | Refills: 1 | Status: SHIPPED | OUTPATIENT
Start: 2024-07-26 | End: 2025-01-22

## 2024-07-26 RX ORDER — LISINOPRIL AND HYDROCHLOROTHIAZIDE 20; 25 MG/1; MG/1
1 TABLET ORAL DAILY
Qty: 30 TABLET | Refills: 11 | Status: SHIPPED | OUTPATIENT
Start: 2024-07-26 | End: 2025-07-26

## 2024-07-26 RX ORDER — LISINOPRIL AND HYDROCHLOROTHIAZIDE 12.5; 2 MG/1; MG/1
1 TABLET ORAL DAILY
Qty: 90 TABLET | Refills: 1 | Status: CANCELLED | OUTPATIENT
Start: 2024-07-26 | End: 2025-01-22

## 2024-07-26 ASSESSMENT — ENCOUNTER SYMPTOMS
DIARRHEA: 0
WEAKNESS: 0
SINUS PRESSURE: 0
FATIGUE: 0
PALPITATIONS: 0
EYE DISCHARGE: 0
FLANK PAIN: 0
CHEST TIGHTNESS: 0
WOUND: 0
NECK STIFFNESS: 0
SHORTNESS OF BREATH: 0
FEVER: 0
NECK PAIN: 0
CHOKING: 0
SLEEP DISTURBANCE: 0
FREQUENCY: 0
SINUS PAIN: 0
EYE PAIN: 0
NUMBNESS: 0
VOMITING: 0
NERVOUS/ANXIOUS: 0
CHILLS: 0
NAUSEA: 0

## 2024-07-26 NOTE — PROGRESS NOTES
Subjective   Patient ID: Penny Rosa is a 69 y.o. female who presents for Follow-up (6 month follow up ), Hypertension, Hyperlipidemia, Depression, Obesity, and Rheumatoid Arthritis.    Hypertension: Patient currently on metoprolol 25 mg once daily and lisinopril-hydrochlorothiazide. Reports BP has still been running high even on the BP meds. Bp running in the 160s/80s.  Current BP this visit is 156/83.      Depression: On citalopram 20 mg daily. Attending group meetings with other grieving moms. Reports better moods. Patient happy and laughing.      Mixed hyperlipidemia: Total cholesterol improved from 220 To 141.  Pt. Reports resuming taking rosuvastatin. Continue working on a low saturated and trans fat diet  Lab Results       Component                Value               Date                       CHOL                     141                 07/16/2024                 CHOL                     220 (H)             01/03/2024                 CHOL                     151                 09/26/2023            Lab Results       Component                Value               Date                       HDL                      64.0                07/16/2024                 HDL                      49.5                01/03/2024                 HDL                      77.2                09/26/2023            Lab Results       Component                Value               Date                       LDLCALC                  54                  07/16/2024                 LDLCALC                  125 (H)             01/03/2024            Lab Results       Component                Value               Date                       TRIG                     113                 07/16/2024                 TRIG                     228 (H)             01/03/2024                 TRIG                     107                 09/26/2023                 Rheumatoid arthritis: Managed by rheumatology. On leflunomide.  Patient reports  "insurance has approved her insurance covering for infusion now. Patient reports the infusion has been helping her pain more that she barely takes aleve to help the pain.  Lab Results       Component                Value               Date                       RF                       16 (H)              04/05/2022                 SEDRATE                  26                  05/17/2024                Morbid Obesity: BMI 44.82- Patient reports walking a lot more and tries to eat as healthy as possible            Review of Systems   Constitutional:  Negative for chills, fatigue and fever.   HENT:  Negative for sinus pressure and sinus pain.    Eyes:  Negative for pain and discharge.   Respiratory:  Negative for choking, chest tightness and shortness of breath.    Cardiovascular:  Negative for chest pain, palpitations and leg swelling.   Gastrointestinal:  Negative for diarrhea, nausea and vomiting.   Endocrine: Negative for cold intolerance and heat intolerance.   Genitourinary:  Negative for flank pain and frequency.   Musculoskeletal:  Negative for neck pain and neck stiffness.   Skin:  Negative for rash and wound.   Neurological:  Negative for weakness and numbness.   Psychiatric/Behavioral:  Negative for self-injury, sleep disturbance and suicidal ideas. The patient is not nervous/anxious.        Objective   /83   Pulse 50   Temp 36.9 °C (98.5 °F) (Temporal)   Resp 18   Ht 1.6 m (5' 3\")   Wt 115 kg (253 lb)   SpO2 95%   BMI 44.82 kg/m²     Physical Exam  Constitutional:       Appearance: Normal appearance.   HENT:      Head: Normocephalic and atraumatic.      Right Ear: Tympanic membrane normal.      Left Ear: Tympanic membrane normal.      Nose: Nose normal.      Mouth/Throat:      Mouth: Mucous membranes are moist.   Eyes:      Extraocular Movements: Extraocular movements intact.      Pupils: Pupils are equal, round, and reactive to light.   Cardiovascular:      Rate and Rhythm: Normal rate and " regular rhythm.      Pulses: Normal pulses.      Heart sounds: Normal heart sounds.   Pulmonary:      Effort: Pulmonary effort is normal.      Breath sounds: Normal breath sounds.   Abdominal:      General: Abdomen is flat.   Musculoskeletal:         General: Deformity present. Normal range of motion.      Cervical back: Normal range of motion and neck supple.      Comments: Follows Rheumatology   Skin:     General: Skin is warm and dry.   Neurological:      Mental Status: She is alert and oriented to person, place, and time.   Psychiatric:         Mood and Affect: Mood normal.         Behavior: Behavior normal.         Assessment/Plan   Problem List Items Addressed This Visit             ICD-10-CM    Depression F32.A     Continue Group therapy  Continue Citalopram 20mg daily   Call for worsening symptoms            Relevant Medications    citalopram (CeleXA) 40 mg tablet    Essential hypertension I10     Increase to lisinopriL-hydrochlorothiazide 20-25 mg tablet daily  Discussed DASH diet and dietary sodium restrictions  Continue Increase dietary efforts and physical activity    Call for consistently elevated bp>140/90  Follow up 6 months         Relevant Medications    metoprolol tartrate (Lopressor) 25 mg tablet    lisinopriL-hydrochlorothiazide 20-25 mg tablet    Other Relevant Orders    Comprehensive metabolic panel    Albumin-Creatinine Ratio, Urine Random    CBC and Auto Differential    Mixed hyperlipidemia - Primary E78.2     Lipid panel improved   Continue statin   Follow up labs in 6months               Relevant Orders    Lipid panel    B12 deficiency E53.8     B12 normal  Continue supplement  Follow up in 6months         Vitamin D deficiency E55.9     Has a hx of hyperparathyroidism   Will continue trending vitamin d level and supplement as needed         Relevant Orders    Vitamin D 25-Hydroxy,Total (for eval of Vitamin D levels)    Morbid obesity with BMI of 40.0-44.9, adult (Multi) E66.01, Z68.41      Work on diet/exercise plan   Follow up 6 months          Rheumatoid arthritis involving multiple sites with positive rheumatoid factor (Multi) M05.79     Follow up rheumatology and continue infusions  Continue current poc   Aleve as needed         Relevant Orders    Rheumatoid Factor     Other Visit Diagnoses         Codes    Encounter for screening mammogram for malignant neoplasm of breast     Z12.31    Relevant Orders    BI mammo bilateral screening tomosynthesis    Vitamin B12 deficiency     E53.8    Relevant Orders    Vitamin B12    Thyroid disorder screen     Z13.29    Relevant Orders    Tsh With Reflex To Free T4 If Abnormal

## 2024-07-26 NOTE — ASSESSMENT & PLAN NOTE
Increase to lisinopriL-hydrochlorothiazide 20-25 mg tablet daily  Discussed DASH diet and dietary sodium restrictions  Continue Increase dietary efforts and physical activity    Call for consistently elevated bp>140/90  Follow up 6 months

## 2024-07-31 ENCOUNTER — APPOINTMENT (OUTPATIENT)
Dept: UROLOGY | Facility: CLINIC | Age: 69
End: 2024-07-31
Payer: MEDICARE

## 2024-07-31 ENCOUNTER — HOSPITAL ENCOUNTER (OUTPATIENT)
Dept: RADIOLOGY | Facility: HOSPITAL | Age: 69
Discharge: HOME | End: 2024-07-31
Payer: MEDICARE

## 2024-07-31 VITALS — WEIGHT: 250 LBS | HEIGHT: 63 IN | BODY MASS INDEX: 44.3 KG/M2

## 2024-07-31 DIAGNOSIS — N32.81 OAB (OVERACTIVE BLADDER): ICD-10-CM

## 2024-07-31 DIAGNOSIS — Z12.31 ENCOUNTER FOR SCREENING MAMMOGRAM FOR MALIGNANT NEOPLASM OF BREAST: ICD-10-CM

## 2024-07-31 DIAGNOSIS — N95.1 VASOMOTOR SYMPTOMS DUE TO MENOPAUSE: ICD-10-CM

## 2024-07-31 DIAGNOSIS — N39.46 MIXED STRESS AND URGE URINARY INCONTINENCE: Primary | ICD-10-CM

## 2024-07-31 PROCEDURE — 77063 BREAST TOMOSYNTHESIS BI: CPT | Performed by: RADIOLOGY

## 2024-07-31 PROCEDURE — 99214 OFFICE O/P EST MOD 30 MIN: CPT

## 2024-07-31 PROCEDURE — 1123F ACP DISCUSS/DSCN MKR DOCD: CPT

## 2024-07-31 PROCEDURE — G2211 COMPLEX E/M VISIT ADD ON: HCPCS

## 2024-07-31 PROCEDURE — 1160F RVW MEDS BY RX/DR IN RCRD: CPT

## 2024-07-31 PROCEDURE — 77067 SCR MAMMO BI INCL CAD: CPT

## 2024-07-31 PROCEDURE — 1036F TOBACCO NON-USER: CPT

## 2024-07-31 PROCEDURE — 77067 SCR MAMMO BI INCL CAD: CPT | Performed by: RADIOLOGY

## 2024-07-31 PROCEDURE — 1126F AMNT PAIN NOTED NONE PRSNT: CPT

## 2024-07-31 PROCEDURE — 1159F MED LIST DOCD IN RCRD: CPT

## 2024-07-31 PROCEDURE — 3008F BODY MASS INDEX DOCD: CPT

## 2024-07-31 RX ORDER — VIBEGRON 75 MG/1
75 TABLET, FILM COATED ORAL NIGHTLY
Qty: 84 TABLET | Refills: 0 | COMMUNITY
Start: 2024-07-31 | End: 2024-10-23

## 2024-07-31 ASSESSMENT — PAIN SCALES - GENERAL: PAINLEVEL: 0-NO PAIN

## 2024-07-31 NOTE — PROGRESS NOTES
[facility]  Outpatient Clinic Note    Patient: Penny Rosa  Age/Sex: 69 y.o., female  MRN: [id number]    Chief Complaint:  3 month follow up         History of Present Illness  This is a 69 y.o. female, who presents for follow-up for medication management.  The patient has been taking oxybutynin for vasomotor symptoms and overactive bladder she reports it was working really well for for her urinary incontinence at first but then saw an increase in her symptoms returning.  The patient stated that she does not believe she is having vasomotor symptoms now.  She believes that she is just sweating due to possible blood pressure issues.  The patient will follow-up with her PCP.  She denies dysuria, gross hematuria, flank pain, pelvic pain, fever or chills.      Past Medical & Surgical History  Past Medical History:   Diagnosis Date    Personal history of malignant neoplasm of other parts of uterus     History of malignant neoplasm of uterus    Personal history of other diseases of the circulatory system     History of hypertension    Personal history of other diseases of the musculoskeletal system and connective tissue     History of arthritis    Personal history of other diseases of the musculoskeletal system and connective tissue     History of rheumatoid arthritis    Personal history of other specified conditions     History of insomnia    Unspecified osteoarthritis, unspecified site     DJD (degenerative joint disease)     Past Surgical History:   Procedure Laterality Date    OTHER SURGICAL HISTORY  10/23/2019    Tubal ligation    OTHER SURGICAL HISTORY  10/23/2019    Dilation and curettage    OTHER SURGICAL HISTORY  10/23/2019    Total hysterectomy with removal of both tubes and ovaries    OTHER SURGICAL HISTORY  10/23/2019    Hip surgery    OTHER SURGICAL HISTORY  10/23/2019    Knee replacement    OTHER SURGICAL HISTORY  02/17/2020    Parathyroidectomy       Family History  Family History   Problem  Relation Name Age of Onset    Hypertension Father      Cancer Father      Diabetes type II Father         Social History  She reports that she has never smoked. She has never used smokeless tobacco. She reports that she does not drink alcohol and does not use drugs.    Allergies  Patient has no known allergies.    Medications:  Current Outpatient Medications on File Prior to Visit   Medication Sig Dispense Refill    citalopram (CeleXA) 40 mg tablet Take 1 tablet (40 mg) by mouth once daily. 90 tablet 1    leflunomide (Arava) 20 mg tablet Take 1 tablet (20 mg) by mouth once daily. 60 tablet 0    lisinopriL-hydrochlorothiazide 20-25 mg tablet Take 1 tablet by mouth once daily. 30 tablet 11    metoprolol tartrate (Lopressor) 25 mg tablet Take 1 tablet (25 mg) by mouth once daily. 90 tablet 1    naproxen sodium (Aleve) 220 mg tablet Take 1 tablet (220 mg) by mouth twice a day.      oxybutynin XL (Ditropan-XL) 5 mg 24 hr tablet Take 1 tablet in am and 1 at night 60 tablet 11    rosuvastatin (Crestor) 10 mg tablet Take 1 tablet (10 mg) by mouth once daily at bedtime. 30 tablet 0    VITAMIN B COMPLEX ORAL Take 1 tablet by mouth once daily.      [DISCONTINUED] citalopram (CeleXA) 40 mg tablet Take 1 tablet (40 mg) by mouth once daily. 30 tablet 0    [DISCONTINUED] lisinopriL-hydrochlorothiazide 20-12.5 mg tablet Take 1 tablet by mouth once daily. 30 tablet 0    [DISCONTINUED] metoprolol tartrate (Lopressor) 25 mg tablet Take 1 tablet (25 mg) by mouth once daily. 30 tablet 0     No current facility-administered medications on file prior to visit.        Review of Systems   A comprehensive 10+ review of systems was negative except for: see hpi          Physical Exam                                                                                                                      General: Well developed, well nourished, alert and cooperative, appears in no acute distress  Head: Normocephalic, atraumatic  Neck: supple,  trachea midline  Eyes: Non-injected conjunctiva, sclera clear, no proptosis  Cardiac: Extremities are warm and well perfused. No edema, cyanosis or pallor.   Lungs: Breathing is easy, non-labored. Speaking in clear and complete sentences. Normal diaphragmatic movement.  Abdomen: soft, non-distended, non-tender, no rebound or guarding, no hernia and no CVA tenderness   MSK: Ambulatory with steady gait, unassisted  Neuro: alert and oriented to person, place and time  Psych: Demonstrates good judgement and reason, without hallucinations, abnormal affect or abnormal behaviors.  Skin: no obvious lesions, no rashes      Labs  N/A    Imaging  N/A    IMPRESSION AND PLAN:  This is a 69 y.o. female, presents with SHAN, OAB, Vasomotor symptoms. he has a history of a hysterectomy for cancer of the uterine in 2016 or 2017. She went through radiation for this as well. She has been in remission ever since. She had one . She feel like she has hot flashes, to the point her back of her neck is drenched in sweat. This happens at night and during the day.     SHAN: Urge dominant  -discussed mechanism of UUI and JOSE, and treatment options for both including PFT, pessary, sling for JOSE and PFT, pharmacotherapy and third-line therapy for OAB  -Bulkamid is associated with slightly less success rate of a sling about 60 to 70% of women having >90% improvement. However, there seems to be similar long-term success compared to sling with fewer side-effects. Main AE is urinary retention which resolves within 24 hours of using a 10-12 [place] catheter.  I discussed that if she still has some leakage after her procedure, she could perform another injection within 4 weeks and this procedure being performed in the office.   -We discussed the sling procedure in depth with her there is a long-term success rate of 70-80% complete continence and up to 90% significant improvement up to 10 years after surgery, though the sling is meant to last a  lifetime, there is a <5% risk of subsequent surgery, either revision or excision within 9 years. The major complications include bladder perforation with sling placement <1%, retention requiring sling lysis 1-3%, transient retention requiring 2-3 days of catheter drainage 33%, and mesh erosion 1-3%.   -Education handouts given to patient on treatment options     OAB  -Stop Oxybutynin  -Start Gemtesa once nightly before bed  -We discussed botox vs sacral neuromodulation: both have similar efficacy 80% patients reports >50% improvement, botox associated with 5% risk of incomplete emptying, increase in UTI and will require re-injection in 6-9 months; and as early as 3 months. SNM is a staged procedure, 2 weeks apart, consisting first of lead implantation then internalization of IPG if there is improvement. Interstim is associated with lead migration, explantation, infection and bleeding, though risks are all <5%. We also discussed PTNS which is associated with success rates comparable to medical therapy but without side-effects without significant major morbidity.   -Education handouts given to patient on treatment options     Vasomotor symptoms  -Stop Oxybutynin    Patient will follow-up with her PCP regarding her increase in sweating     Follow up 3 months    All questions and concerns were answered and addressed.  The patient expressed understanding and agrees with the plan.     Reviewed and approved by COURT CALDERON on 7/31/24 at 7:22 AM.

## 2024-08-14 ENCOUNTER — APPOINTMENT (OUTPATIENT)
Dept: RHEUMATOLOGY | Facility: CLINIC | Age: 69
End: 2024-08-14
Payer: MEDICARE

## 2024-08-16 ENCOUNTER — INFUSION (OUTPATIENT)
Dept: INFUSION THERAPY | Facility: HOSPITAL | Age: 69
End: 2024-08-16
Payer: MEDICARE

## 2024-08-16 VITALS
BODY MASS INDEX: 44.05 KG/M2 | HEART RATE: 56 BPM | TEMPERATURE: 97.6 F | DIASTOLIC BLOOD PRESSURE: 90 MMHG | SYSTOLIC BLOOD PRESSURE: 142 MMHG | WEIGHT: 248.68 LBS | OXYGEN SATURATION: 95 % | RESPIRATION RATE: 16 BRPM

## 2024-08-16 DIAGNOSIS — M05.79 RHEUMATOID ARTHRITIS INVOLVING MULTIPLE SITES WITH POSITIVE RHEUMATOID FACTOR (MULTI): ICD-10-CM

## 2024-08-16 PROCEDURE — 2500000004 HC RX 250 GENERAL PHARMACY W/ HCPCS (ALT 636 FOR OP/ED): Mod: JZ | Performed by: INTERNAL MEDICINE

## 2024-08-16 PROCEDURE — 96365 THER/PROPH/DIAG IV INF INIT: CPT | Mod: INF

## 2024-08-16 RX ORDER — ALBUTEROL SULFATE 0.83 MG/ML
3 SOLUTION RESPIRATORY (INHALATION) AS NEEDED
OUTPATIENT
Start: 2024-09-03

## 2024-08-16 RX ORDER — EPINEPHRINE 0.3 MG/.3ML
0.3 INJECTION SUBCUTANEOUS EVERY 5 MIN PRN
OUTPATIENT
Start: 2024-09-03

## 2024-08-16 RX ORDER — DIPHENHYDRAMINE HYDROCHLORIDE 50 MG/ML
50 INJECTION INTRAMUSCULAR; INTRAVENOUS AS NEEDED
OUTPATIENT
Start: 2024-09-03

## 2024-08-16 RX ORDER — FAMOTIDINE 10 MG/ML
20 INJECTION INTRAVENOUS ONCE AS NEEDED
OUTPATIENT
Start: 2024-09-03

## 2024-08-16 ASSESSMENT — PATIENT HEALTH QUESTIONNAIRE - PHQ9
1. LITTLE INTEREST OR PLEASURE IN DOING THINGS: NOT AT ALL
SUM OF ALL RESPONSES TO PHQ9 QUESTIONS 1 AND 2: 0
2. FEELING DOWN, DEPRESSED OR HOPELESS: NOT AT ALL

## 2024-08-16 ASSESSMENT — ENCOUNTER SYMPTOMS
OCCASIONAL FEELINGS OF UNSTEADINESS: 0
LOSS OF SENSATION IN FEET: 1
DEPRESSION: 0

## 2024-08-16 ASSESSMENT — PAIN SCALES - GENERAL: PAINLEVEL: 0-NO PAIN

## 2024-08-28 ENCOUNTER — TELEPHONE (OUTPATIENT)
Dept: PRIMARY CARE | Facility: CLINIC | Age: 69
End: 2024-08-28
Payer: MEDICARE

## 2024-08-28 DIAGNOSIS — M05.79 RHEUMATOID ARTHRITIS INVOLVING MULTIPLE SITES WITH POSITIVE RHEUMATOID FACTOR (MULTI): Primary | ICD-10-CM

## 2024-08-28 DIAGNOSIS — M06.00 RHEUMATOID ARTHRITIS WITH NEGATIVE RHEUMATOID FACTOR, INVOLVING UNSPECIFIED SITE (MULTI): ICD-10-CM

## 2024-08-28 RX ORDER — TOCILIZUMAB 20 MG/ML
4 INJECTION, SOLUTION, CONCENTRATE INTRAVENOUS
Qty: 40 ML | Refills: 11 | Status: SHIPPED | OUTPATIENT
Start: 2024-08-28

## 2024-08-28 NOTE — TELEPHONE ENCOUNTER
8/30/24 This has been faxed to [employer] [facility]        Phone call from [facility]-  The patient need a refill for the patients Amara sent to [facility] for her September ifusion  If this could be printed so I can follow up where to send it    ty

## 2024-08-30 RX ORDER — LEFLUNOMIDE 20 MG/1
20 TABLET ORAL DAILY
Qty: 60 TABLET | Refills: 0 | Status: SHIPPED | OUTPATIENT
Start: 2024-08-30 | End: 2024-10-29

## 2024-08-30 NOTE — TELEPHONE ENCOUNTER
Patient called for a refill on    Leflunomide 20 mg tablet #60 take one tablet twice a day    Lita Norwood [phone]

## 2024-09-11 ENCOUNTER — APPOINTMENT (OUTPATIENT)
Dept: RHEUMATOLOGY | Facility: CLINIC | Age: 69
End: 2024-09-11
Payer: MEDICARE

## 2024-09-11 VITALS
HEART RATE: 54 BPM | HEIGHT: 63 IN | OXYGEN SATURATION: 94 % | SYSTOLIC BLOOD PRESSURE: 134 MMHG | DIASTOLIC BLOOD PRESSURE: 73 MMHG | WEIGHT: 250 LBS | BODY MASS INDEX: 44.3 KG/M2

## 2024-09-11 DIAGNOSIS — M85.89 OSTEOPENIA OF MULTIPLE SITES: ICD-10-CM

## 2024-09-11 DIAGNOSIS — M05.79 RHEUMATOID ARTHRITIS INVOLVING MULTIPLE SITES WITH POSITIVE RHEUMATOID FACTOR (MULTI): Primary | ICD-10-CM

## 2024-09-11 PROCEDURE — 1123F ACP DISCUSS/DSCN MKR DOCD: CPT | Performed by: INTERNAL MEDICINE

## 2024-09-11 PROCEDURE — 3075F SYST BP GE 130 - 139MM HG: CPT | Performed by: INTERNAL MEDICINE

## 2024-09-11 PROCEDURE — 1036F TOBACCO NON-USER: CPT | Performed by: INTERNAL MEDICINE

## 2024-09-11 PROCEDURE — 3078F DIAST BP <80 MM HG: CPT | Performed by: INTERNAL MEDICINE

## 2024-09-11 PROCEDURE — 3008F BODY MASS INDEX DOCD: CPT | Performed by: INTERNAL MEDICINE

## 2024-09-11 PROCEDURE — 99213 OFFICE O/P EST LOW 20 MIN: CPT | Performed by: INTERNAL MEDICINE

## 2024-09-11 NOTE — PATIENT INSTRUCTIONS
Current we will continue leflunomide and Actemra as prescribed.  Call me if any question.  Follow-up in 3 months.

## 2024-09-11 NOTE — PROGRESS NOTES
"Subjective  . Penny Rosa is a 69 y.o. female who presents for Follow-up.    HPI. 69-year-old female with history of seropositive RA, OA s/p bilateral TKA and right CRISTHIAN, s/p right shoulder surgery, endometrial CA s/p BENJAMIN/BSO (2017), HTN and obesity presented for follow-up.     She is back on tocilizumab infusions since May.  She stated that she is feeling fine.  She has chronic arthritic changes of the hands.    Immunosuppression: Leflunomide and Actemra.     Past immunosuppressive therapy:  1. MTX.  2. Humira.  3. Enbrel  4. Actemra.    5. Prednisone.    Review of Systems   All other systems reviewed and are negative.    Objective     Blood pressure 134/73, pulse 54, height 1.6 m (5' 3\"), weight 113 kg (250 lb), SpO2 94%.    Physical Exam.  Gen. AAO x3, NAD.  HEENT: No pallor or icterus, PERRLA, EOMI. No cervical lymphadenopathy .  Skin: No rashes.  Heart: S1, S2/ RRR.   Lungs: CTA B.  Abdomen: Soft, NT/ND, BS regular.  MSK: Bilateral thumb with J deformity.  Bilateral swan-neck and boutonniere's deformities.  Handgrip fair.  Right wrist with decreased range of motion.  Bilateral elbow without swelling and tenderness.  Bilateral ankle and MTPs without swelling and tenderness.  Bilateral shoulders and knees with good range of motion.   Neuro: Sensation to touch intact.Strength 5/5 throughout.   Psych:Appropriate mood and behavior  EXT: No edema    Assessment/Plan . 69-year-old female with history of seropositive RA, OA s/p bilateral TKA and right CRISTHIAN, s/p right shoulder surgery, endometrial CA s/p BENJAMIN/BSO (2017), HTN and obesity presented for follow-up.       #1: Seropositive RA.  She is doing better.  Chronic deformities as aforementioned.  -Continue tocilizumab infusion every 4 weeks.  -Continue leflunomide 20 mg daily.  -Labs.    #2: Osteopenia.  -Continue calcium and vitamin D.  -Schedule DEXA scan in December.    Follow-up in 3 months.     This note was partially generated using the Dragon Voice " recognition system. There may be some incorrect wording, spelling and/or spelling errors or punctuation errors that were not corrected prior to committing the note to the medical record.    Problem List Items Addressed This Visit       Osteopenia of multiple sites    Relevant Orders    XR DEXA bone density    Rheumatoid arthritis involving multiple sites with positive rheumatoid factor (Multi) - Primary    Relevant Orders    C-Reactive Protein    CBC    Sedimentation Rate            Active Ambulatory Problems     Diagnosis Date Noted    Acute bronchitis 04/27/2023    Adenomatous polyp of sigmoid colon 04/27/2023    Adenomatous polyps 04/27/2023    Ankle swelling 04/27/2023    Arthritis of left elbow 04/27/2023    Chronic diarrhea 04/27/2023    Depression 04/27/2023    Essential hypertension 04/27/2023    Frequency-urgency syndrome 04/27/2023    Overactive bladder 04/27/2023    Hypercalcemia 04/27/2023    Injury of left knee 04/27/2023    Injury of toe on right foot 04/27/2023    Lipoma of right lower extremity 04/27/2023    Lumbago 04/27/2023    Mixed hyperlipidemia 04/27/2023    Nocturia 04/27/2023    Osteopenia of multiple sites 04/27/2023    Primary hyperparathyroidism (Multi) 04/27/2023    Recurrent major depressive disorder, in full remission (CMS-[facility]) 04/27/2023    Shoulder arthritis 04/27/2023    Right foot strain, initial encounter 04/27/2023    Right shoulder pain 04/27/2023    Shoulder impingement, right 04/27/2023    Sore throat 04/27/2023    Status post total replacement of right shoulder 04/27/2023    Strain of left knee and leg 04/27/2023    Tonsillitis 04/27/2023    Tubular adenoma of colon 04/27/2023    Yeast infection of the skin 04/27/2023    Bilateral hearing loss due to cerumen impaction 06/30/2023    B12 deficiency 06/30/2023    Impaired fasting blood sugar 06/30/2023    Vitamin D deficiency 06/30/2023    Endometrial cancer (Multi) 10/22/2023    H/O: hysterectomy 10/22/2023    Urge incontinence  10/22/2023    Primary localized osteoarthritis of left pelvic region and thigh 06/01/2010    Morbid obesity with BMI of 40.0-44.9, adult (Multi) 10/22/2023    Rheumatoid arthritis involving multiple sites with positive rheumatoid factor (Multi) 10/25/2023    Pharyngitis 03/25/2024    Laryngitis acute, spasmodic 03/25/2024     Resolved Ambulatory Problems     Diagnosis Date Noted    Rheumatoid arthritis (Multi) 04/27/2023     Past Medical History:   Diagnosis Date    Hypercholesteremia     Hypertension     Personal history of malignant neoplasm of other parts of uterus     Personal history of other diseases of the circulatory system     Personal history of other diseases of the musculoskeletal system and connective tissue     Personal history of other diseases of the musculoskeletal system and connective tissue     Personal history of other specified conditions     Unspecified osteoarthritis, unspecified site     Uterine cancer (Multi)        Family History   Problem Relation Name Age of Onset    Hypertension Father      Cancer Father      Diabetes type II Father         Past Surgical History:   Procedure Laterality Date    COLONOSCOPY      HYSTERECTOMY      OTHER SURGICAL HISTORY  10/23/2019    Tubal ligation    OTHER SURGICAL HISTORY  10/23/2019    Dilation and curettage    OTHER SURGICAL HISTORY  10/23/2019    Total hysterectomy with removal of both tubes and ovaries    OTHER SURGICAL HISTORY  10/23/2019    Hip surgery    OTHER SURGICAL HISTORY  10/23/2019    Knee replacement    OTHER SURGICAL HISTORY  02/17/2020    Parathyroidectomy       Social History     Tobacco Use   Smoking Status Never   Smokeless Tobacco Never       Allergies  Patient has no known allergies.    Current Meds  Current Outpatient Medications   Medication Instructions    Actemra 4 mg/kg, intravenous, Every 28 days, Every 4 weeks    citalopram (CELEXA) 40 mg, oral, Daily    Gemtesa 75 mg, oral, Nightly, LOT [id number]<BR>EXP Oct 2027    leflunomide  (ARAVA) 20 mg, oral, Daily    lisinopriL-hydrochlorothiazide 20-25 mg tablet 1 tablet, oral, Daily    metoprolol tartrate (LOPRESSOR) 25 mg, oral, Daily    naproxen sodium (ALEVE) 220 mg, oral, 2 times daily    oxybutynin XL (Ditropan-XL) 5 mg 24 hr tablet Take 1 tablet in am and 1 at night    rosuvastatin (CRESTOR) 10 mg, oral, Nightly    VITAMIN B COMPLEX ORAL 1 tablet, oral, Daily RT        Lab Results   Component Value Date    RF 16 (H) 04/05/2022    SEDRATE 26 05/17/2024    CRP 0.13 05/17/2024    URICACID 4.2 02/15/2024             Jerel Kolb MD

## 2024-09-13 ENCOUNTER — INFUSION (OUTPATIENT)
Dept: INFUSION THERAPY | Facility: HOSPITAL | Age: 69
End: 2024-09-13
Payer: MEDICARE

## 2024-09-13 VITALS
HEART RATE: 56 BPM | SYSTOLIC BLOOD PRESSURE: 170 MMHG | DIASTOLIC BLOOD PRESSURE: 58 MMHG | TEMPERATURE: 96.6 F | OXYGEN SATURATION: 96 % | RESPIRATION RATE: 18 BRPM

## 2024-09-13 DIAGNOSIS — M05.79 RHEUMATOID ARTHRITIS INVOLVING MULTIPLE SITES WITH POSITIVE RHEUMATOID FACTOR (MULTI): ICD-10-CM

## 2024-09-13 PROCEDURE — 2500000004 HC RX 250 GENERAL PHARMACY W/ HCPCS (ALT 636 FOR OP/ED): Mod: JZ | Performed by: INTERNAL MEDICINE

## 2024-09-13 PROCEDURE — 96365 THER/PROPH/DIAG IV INF INIT: CPT | Mod: INF

## 2024-09-13 RX ORDER — DIPHENHYDRAMINE HYDROCHLORIDE 50 MG/ML
50 INJECTION INTRAMUSCULAR; INTRAVENOUS AS NEEDED
OUTPATIENT
Start: 2024-10-05

## 2024-09-13 RX ORDER — ALBUTEROL SULFATE 0.83 MG/ML
3 SOLUTION RESPIRATORY (INHALATION) AS NEEDED
Status: DISCONTINUED | OUTPATIENT
Start: 2024-09-13 | End: 2024-09-13 | Stop reason: HOSPADM

## 2024-09-13 RX ORDER — DIPHENHYDRAMINE HYDROCHLORIDE 50 MG/ML
50 INJECTION INTRAMUSCULAR; INTRAVENOUS AS NEEDED
Status: DISCONTINUED | OUTPATIENT
Start: 2024-09-13 | End: 2024-09-13 | Stop reason: HOSPADM

## 2024-09-13 RX ORDER — ALBUTEROL SULFATE 0.83 MG/ML
3 SOLUTION RESPIRATORY (INHALATION) AS NEEDED
OUTPATIENT
Start: 2024-10-05

## 2024-09-13 RX ORDER — FAMOTIDINE 10 MG/ML
20 INJECTION INTRAVENOUS ONCE AS NEEDED
OUTPATIENT
Start: 2024-10-05

## 2024-09-13 RX ORDER — EPINEPHRINE 1 MG/ML
0.3 INJECTION INTRAMUSCULAR; INTRAVENOUS; SUBCUTANEOUS EVERY 5 MIN PRN
Status: DISCONTINUED | OUTPATIENT
Start: 2024-09-13 | End: 2024-09-13 | Stop reason: HOSPADM

## 2024-09-13 RX ORDER — FAMOTIDINE 10 MG/ML
20 INJECTION INTRAVENOUS ONCE AS NEEDED
Status: DISCONTINUED | OUTPATIENT
Start: 2024-09-13 | End: 2024-09-13 | Stop reason: HOSPADM

## 2024-09-13 RX ORDER — HEPARIN SODIUM,PORCINE/PF 10 UNIT/ML
50 SYRINGE (ML) INTRAVENOUS AS NEEDED
OUTPATIENT
Start: 2024-09-13

## 2024-09-13 RX ORDER — EPINEPHRINE 0.3 MG/.3ML
0.3 INJECTION SUBCUTANEOUS EVERY 5 MIN PRN
OUTPATIENT
Start: 2024-10-05

## 2024-09-13 ASSESSMENT — ENCOUNTER SYMPTOMS
OCCASIONAL FEELINGS OF UNSTEADINESS: 0
DEPRESSION: 0
LOSS OF SENSATION IN FEET: 0

## 2024-10-01 DIAGNOSIS — M05.79 RHEUMATOID ARTHRITIS INVOLVING MULTIPLE SITES WITH POSITIVE RHEUMATOID FACTOR (MULTI): Primary | ICD-10-CM

## 2024-10-08 ENCOUNTER — LAB (OUTPATIENT)
Dept: LAB | Facility: LAB | Age: 69
End: 2024-10-08
Payer: MEDICARE

## 2024-10-08 DIAGNOSIS — M05.79 RHEUMATOID ARTHRITIS INVOLVING MULTIPLE SITES WITH POSITIVE RHEUMATOID FACTOR (MULTI): ICD-10-CM

## 2024-10-08 LAB
ALT SERPL W P-5'-P-CCNC: 31 U/L (ref 7–45)
AST SERPL W P-5'-P-CCNC: 26 U/L (ref 9–39)
BASOPHILS # BLD AUTO: 0.13 X10*3/UL (ref 0–0.1)
BASOPHILS NFR BLD AUTO: 2.6 %
CRP SERPL-MCNC: <0.1 MG/DL
EOSINOPHIL # BLD AUTO: 0.18 X10*3/UL (ref 0–0.7)
EOSINOPHIL NFR BLD AUTO: 3.6 %
ERYTHROCYTE [DISTWIDTH] IN BLOOD BY AUTOMATED COUNT: 14.5 % (ref 11.5–14.5)
ERYTHROCYTE [SEDIMENTATION RATE] IN BLOOD BY WESTERGREN METHOD: <1 MM/H (ref 0–30)
HCT VFR BLD AUTO: 44.1 % (ref 36–46)
HGB BLD-MCNC: 14 G/DL (ref 12–16)
IMM GRANULOCYTES # BLD AUTO: 0.02 X10*3/UL (ref 0–0.7)
IMM GRANULOCYTES NFR BLD AUTO: 0.4 % (ref 0–0.9)
LYMPHOCYTES # BLD AUTO: 1.24 X10*3/UL (ref 1.2–4.8)
LYMPHOCYTES NFR BLD AUTO: 24.7 %
MCH RBC QN AUTO: 29.8 PG (ref 26–34)
MCHC RBC AUTO-ENTMCNC: 31.7 G/DL (ref 32–36)
MCV RBC AUTO: 94 FL (ref 80–100)
MONOCYTES # BLD AUTO: 0.78 X10*3/UL (ref 0.1–1)
MONOCYTES NFR BLD AUTO: 15.5 %
NEUTROPHILS # BLD AUTO: 2.68 X10*3/UL (ref 1.2–7.7)
NEUTROPHILS NFR BLD AUTO: 53.2 %
NRBC BLD-RTO: 0 /100 WBCS (ref 0–0)
PLATELET # BLD AUTO: 217 X10*3/UL (ref 150–450)
RBC # BLD AUTO: 4.7 X10*6/UL (ref 4–5.2)
WBC # BLD AUTO: 5 X10*3/UL (ref 4.4–11.3)

## 2024-10-08 PROCEDURE — 85025 COMPLETE CBC W/AUTO DIFF WBC: CPT

## 2024-10-08 PROCEDURE — 84450 TRANSFERASE (AST) (SGOT): CPT

## 2024-10-08 PROCEDURE — 86140 C-REACTIVE PROTEIN: CPT

## 2024-10-08 PROCEDURE — 36415 COLL VENOUS BLD VENIPUNCTURE: CPT

## 2024-10-08 PROCEDURE — 85652 RBC SED RATE AUTOMATED: CPT

## 2024-10-08 PROCEDURE — 84460 ALANINE AMINO (ALT) (SGPT): CPT

## 2024-10-09 DIAGNOSIS — M06.00 RHEUMATOID ARTHRITIS WITH NEGATIVE RHEUMATOID FACTOR, INVOLVING UNSPECIFIED SITE (MULTI): ICD-10-CM

## 2024-10-09 RX ORDER — LEFLUNOMIDE 20 MG/1
20 TABLET ORAL DAILY
Qty: 60 TABLET | Refills: 0 | Status: SHIPPED | OUTPATIENT
Start: 2024-10-09 | End: 2024-12-08

## 2024-10-11 ENCOUNTER — INFUSION (OUTPATIENT)
Dept: INFUSION THERAPY | Facility: HOSPITAL | Age: 69
End: 2024-10-11
Payer: MEDICARE

## 2024-10-11 VITALS
WEIGHT: 250.66 LBS | SYSTOLIC BLOOD PRESSURE: 167 MMHG | HEART RATE: 52 BPM | DIASTOLIC BLOOD PRESSURE: 76 MMHG | RESPIRATION RATE: 20 BRPM | TEMPERATURE: 97.8 F | BODY MASS INDEX: 44.4 KG/M2 | OXYGEN SATURATION: 94 %

## 2024-10-11 DIAGNOSIS — M05.79 RHEUMATOID ARTHRITIS INVOLVING MULTIPLE SITES WITH POSITIVE RHEUMATOID FACTOR (MULTI): ICD-10-CM

## 2024-10-11 PROCEDURE — 96365 THER/PROPH/DIAG IV INF INIT: CPT | Mod: INF

## 2024-10-11 PROCEDURE — 2500000004 HC RX 250 GENERAL PHARMACY W/ HCPCS (ALT 636 FOR OP/ED): Performed by: INTERNAL MEDICINE

## 2024-10-11 RX ORDER — DIPHENHYDRAMINE HYDROCHLORIDE 50 MG/ML
50 INJECTION INTRAMUSCULAR; INTRAVENOUS AS NEEDED
OUTPATIENT
Start: 2024-11-05

## 2024-10-11 RX ORDER — ALBUTEROL SULFATE 0.83 MG/ML
3 SOLUTION RESPIRATORY (INHALATION) AS NEEDED
OUTPATIENT
Start: 2024-11-05

## 2024-10-11 RX ORDER — FAMOTIDINE 10 MG/ML
20 INJECTION INTRAVENOUS ONCE AS NEEDED
OUTPATIENT
Start: 2024-11-05

## 2024-10-11 RX ORDER — EPINEPHRINE 0.3 MG/.3ML
0.3 INJECTION SUBCUTANEOUS EVERY 5 MIN PRN
OUTPATIENT
Start: 2024-11-05

## 2024-10-11 RX ORDER — HEPARIN SODIUM,PORCINE/PF 10 UNIT/ML
50 SYRINGE (ML) INTRAVENOUS AS NEEDED
OUTPATIENT
Start: 2024-10-11

## 2024-10-29 ENCOUNTER — APPOINTMENT (OUTPATIENT)
Dept: UROLOGY | Facility: CLINIC | Age: 69
End: 2024-10-29
Payer: MEDICARE

## 2024-10-29 DIAGNOSIS — N95.1 VASOMOTOR SYMPTOMS DUE TO MENOPAUSE: ICD-10-CM

## 2024-10-29 DIAGNOSIS — N39.46 MIXED STRESS AND URGE URINARY INCONTINENCE: Primary | ICD-10-CM

## 2024-10-29 DIAGNOSIS — N32.81 OAB (OVERACTIVE BLADDER): ICD-10-CM

## 2024-10-29 PROCEDURE — 1123F ACP DISCUSS/DSCN MKR DOCD: CPT

## 2024-10-29 PROCEDURE — 99214 OFFICE O/P EST MOD 30 MIN: CPT

## 2024-10-29 PROCEDURE — G2211 COMPLEX E/M VISIT ADD ON: HCPCS

## 2024-10-29 RX ORDER — VIBEGRON 75 MG/1
75 TABLET, FILM COATED ORAL NIGHTLY
Qty: 90 TABLET | Refills: 3 | Status: SHIPPED | OUTPATIENT
Start: 2024-10-29 | End: 2025-10-29

## 2024-10-31 DIAGNOSIS — N39.46 MIXED STRESS AND URGE URINARY INCONTINENCE: Primary | ICD-10-CM

## 2024-10-31 DIAGNOSIS — N32.81 OVERACTIVE BLADDER: ICD-10-CM

## 2024-11-01 DIAGNOSIS — N32.81 OAB (OVERACTIVE BLADDER): Primary | ICD-10-CM

## 2024-11-01 RX ORDER — SOLIFENACIN SUCCINATE 10 MG/1
10 TABLET, FILM COATED ORAL DAILY
Qty: 30 TABLET | Refills: 5 | Status: SHIPPED | OUTPATIENT
Start: 2024-11-01 | End: 2025-04-30

## 2024-11-04 RX ORDER — HEPARIN SODIUM,PORCINE/PF 10 UNIT/ML
50 SYRINGE (ML) INTRAVENOUS AS NEEDED
OUTPATIENT
Start: 2024-11-04

## 2024-11-08 ENCOUNTER — INFUSION (OUTPATIENT)
Dept: INFUSION THERAPY | Facility: HOSPITAL | Age: 69
End: 2024-11-08
Payer: MEDICARE

## 2024-11-08 VITALS
RESPIRATION RATE: 16 BRPM | OXYGEN SATURATION: 97 % | DIASTOLIC BLOOD PRESSURE: 80 MMHG | SYSTOLIC BLOOD PRESSURE: 157 MMHG | HEART RATE: 50 BPM

## 2024-11-08 DIAGNOSIS — M05.79 RHEUMATOID ARTHRITIS INVOLVING MULTIPLE SITES WITH POSITIVE RHEUMATOID FACTOR (MULTI): ICD-10-CM

## 2024-11-08 PROCEDURE — 2500000004 HC RX 250 GENERAL PHARMACY W/ HCPCS (ALT 636 FOR OP/ED): Mod: JZ | Performed by: INTERNAL MEDICINE

## 2024-11-08 PROCEDURE — 96413 CHEMO IV INFUSION 1 HR: CPT

## 2024-11-08 RX ORDER — FAMOTIDINE 10 MG/ML
20 INJECTION INTRAVENOUS ONCE AS NEEDED
OUTPATIENT
Start: 2024-12-05

## 2024-11-08 RX ORDER — HEPARIN SODIUM,PORCINE/PF 10 UNIT/ML
50 SYRINGE (ML) INTRAVENOUS AS NEEDED
OUTPATIENT
Start: 2024-11-08

## 2024-11-08 RX ORDER — ALBUTEROL SULFATE 0.83 MG/ML
3 SOLUTION RESPIRATORY (INHALATION) AS NEEDED
OUTPATIENT
Start: 2024-12-05

## 2024-11-08 RX ORDER — EPINEPHRINE 0.3 MG/.3ML
0.3 INJECTION SUBCUTANEOUS EVERY 5 MIN PRN
OUTPATIENT
Start: 2024-12-05

## 2024-11-08 RX ORDER — DIPHENHYDRAMINE HYDROCHLORIDE 50 MG/ML
50 INJECTION INTRAMUSCULAR; INTRAVENOUS AS NEEDED
OUTPATIENT
Start: 2024-12-05

## 2024-11-08 ASSESSMENT — ENCOUNTER SYMPTOMS
DEPRESSION: 0
OCCASIONAL FEELINGS OF UNSTEADINESS: 0
LOSS OF SENSATION IN FEET: 0

## 2024-11-29 ENCOUNTER — APPOINTMENT (OUTPATIENT)
Dept: PHARMACY | Facility: HOSPITAL | Age: 69
End: 2024-11-29
Payer: MEDICARE

## 2024-12-06 ENCOUNTER — INFUSION (OUTPATIENT)
Dept: INFUSION THERAPY | Facility: HOSPITAL | Age: 69
End: 2024-12-06
Payer: MEDICARE

## 2024-12-06 VITALS
HEART RATE: 62 BPM | TEMPERATURE: 97 F | SYSTOLIC BLOOD PRESSURE: 142 MMHG | OXYGEN SATURATION: 95 % | DIASTOLIC BLOOD PRESSURE: 87 MMHG | RESPIRATION RATE: 18 BRPM | BODY MASS INDEX: 44.64 KG/M2 | WEIGHT: 252 LBS

## 2024-12-06 DIAGNOSIS — M05.79 RHEUMATOID ARTHRITIS INVOLVING MULTIPLE SITES WITH POSITIVE RHEUMATOID FACTOR (MULTI): ICD-10-CM

## 2024-12-06 DIAGNOSIS — Z51.12 ENCOUNTER FOR ANTINEOPLASTIC IMMUNOTHERAPY: ICD-10-CM

## 2024-12-06 PROCEDURE — 96365 THER/PROPH/DIAG IV INF INIT: CPT | Mod: INF

## 2024-12-06 PROCEDURE — 2500000004 HC RX 250 GENERAL PHARMACY W/ HCPCS (ALT 636 FOR OP/ED): Mod: JZ | Performed by: INTERNAL MEDICINE

## 2024-12-06 RX ORDER — FAMOTIDINE 10 MG/ML
20 INJECTION INTRAVENOUS ONCE AS NEEDED
OUTPATIENT
Start: 2025-01-05

## 2024-12-06 RX ORDER — DIPHENHYDRAMINE HYDROCHLORIDE 50 MG/ML
50 INJECTION INTRAMUSCULAR; INTRAVENOUS AS NEEDED
OUTPATIENT
Start: 2025-01-05

## 2024-12-06 RX ORDER — ALBUTEROL SULFATE 0.83 MG/ML
3 SOLUTION RESPIRATORY (INHALATION) AS NEEDED
OUTPATIENT
Start: 2025-01-05

## 2024-12-06 RX ORDER — EPINEPHRINE 0.3 MG/.3ML
0.3 INJECTION SUBCUTANEOUS EVERY 5 MIN PRN
OUTPATIENT
Start: 2025-01-05

## 2024-12-06 RX ORDER — HEPARIN SODIUM,PORCINE/PF 10 UNIT/ML
50 SYRINGE (ML) INTRAVENOUS AS NEEDED
OUTPATIENT
Start: 2024-12-06

## 2024-12-06 ASSESSMENT — ENCOUNTER SYMPTOMS
LOSS OF SENSATION IN FEET: 0
OCCASIONAL FEELINGS OF UNSTEADINESS: 0
DEPRESSION: 0

## 2024-12-06 ASSESSMENT — COLUMBIA-SUICIDE SEVERITY RATING SCALE - C-SSRS
1. IN THE PAST MONTH, HAVE YOU WISHED YOU WERE DEAD OR WISHED YOU COULD GO TO SLEEP AND NOT WAKE UP?: NO
6. HAVE YOU EVER DONE ANYTHING, STARTED TO DO ANYTHING, OR PREPARED TO DO ANYTHING TO END YOUR LIFE?: NO
2. HAVE YOU ACTUALLY HAD ANY THOUGHTS OF KILLING YOURSELF?: NO

## 2024-12-06 ASSESSMENT — PATIENT HEALTH QUESTIONNAIRE - PHQ9
SUM OF ALL RESPONSES TO PHQ9 QUESTIONS 1 AND 2: 0
2. FEELING DOWN, DEPRESSED OR HOPELESS: NOT AT ALL
1. LITTLE INTEREST OR PLEASURE IN DOING THINGS: NOT AT ALL

## 2024-12-06 ASSESSMENT — PAIN SCALES - GENERAL: PAINLEVEL_OUTOF10: 0-NO PAIN

## 2024-12-11 ENCOUNTER — APPOINTMENT (OUTPATIENT)
Dept: RHEUMATOLOGY | Facility: CLINIC | Age: 69
End: 2024-12-11
Payer: MEDICARE

## 2024-12-11 VITALS
BODY MASS INDEX: 44.65 KG/M2 | DIASTOLIC BLOOD PRESSURE: 76 MMHG | HEART RATE: 55 BPM | SYSTOLIC BLOOD PRESSURE: 120 MMHG | WEIGHT: 252 LBS | HEIGHT: 63 IN

## 2024-12-11 DIAGNOSIS — M85.89 OSTEOPENIA OF MULTIPLE SITES: ICD-10-CM

## 2024-12-11 DIAGNOSIS — M05.79 RHEUMATOID ARTHRITIS INVOLVING MULTIPLE SITES WITH POSITIVE RHEUMATOID FACTOR (MULTI): Primary | ICD-10-CM

## 2024-12-11 PROCEDURE — 1123F ACP DISCUSS/DSCN MKR DOCD: CPT | Performed by: INTERNAL MEDICINE

## 2024-12-11 PROCEDURE — 1159F MED LIST DOCD IN RCRD: CPT | Performed by: INTERNAL MEDICINE

## 2024-12-11 PROCEDURE — 3008F BODY MASS INDEX DOCD: CPT | Performed by: INTERNAL MEDICINE

## 2024-12-11 PROCEDURE — 3074F SYST BP LT 130 MM HG: CPT | Performed by: INTERNAL MEDICINE

## 2024-12-11 PROCEDURE — 1036F TOBACCO NON-USER: CPT | Performed by: INTERNAL MEDICINE

## 2024-12-11 PROCEDURE — 99213 OFFICE O/P EST LOW 20 MIN: CPT | Performed by: INTERNAL MEDICINE

## 2024-12-11 PROCEDURE — 3078F DIAST BP <80 MM HG: CPT | Performed by: INTERNAL MEDICINE

## 2024-12-11 NOTE — PROGRESS NOTES
"Subjective . Penny Rosa is a 69 y.o. female who presents for Follow-up (3 month follow up).    HPI. 69-year-old female with history of seropositive RA, OA s/p bilateral TKA and right CRISTHIAN, s/p right shoulder surgery, endometrial CA s/p BENJAMIN/BSO (2017), HTN and obesity presented for follow-up.     She stated that she is feeling fine.  Denies joint pain, swelling or morning stiffness.  She has chronic arthritic changes in hands.  She has no recent fall or fractures.    Immunosuppression: Leflunomide and Actemra.     Past immunosuppressive therapy:  1. MTX.  2. Humira.  3. Enbrel  4. Actemra.    5. Prednisone.    5/17/2024.  T spot TB test.  Negative.  4/05/2022.  CCP.  24.                      RF. 16.    3/29/2022.  Bilateral hand and elbows x-ray:  -Advanced arthritic changes bilateral hands, wrists, and elbows  consistent with rheumatoid arthritis.    12/16/2022.  DEXA scan.  L1-L4.  T-score -1.3,  Left femoral neck.  T-score -1.0.    Review of Systems   All other systems reviewed and are negative.    Objective     Blood pressure 120/76, pulse 55, height 1.6 m (5' 3\"), weight 114 kg (252 lb).    Physical Exam.  Gen. AAO x3, NAD.  HEENT: No pallor or icterus, PERRLA, EOMI. No cervical lymphadenopathy .  Skin: No rashes.  Heart: S1, S2/ RRR.   Lungs: CTA B.  Abdomen: Soft, NT/ND.  MSK: No swollen or tender joint.Bilateral thumb with J deformity. Bilateral swan-neck and boutonniere's deformities. Handgrip fair. Right wrist with decreased range of motion. Bilateral elbow without swelling and tenderness.   Neuro: Sensation to touch intact.Strength 5/5 throughout.   Psych:Appropriate mood and behavior  EXT: No edema    Assessment/Plan .    #1: Seropositive RA.  -Continue Actemra infusion every 4 weeks.  -Continue leflunomide 20 mg daily.  -Labs.    #2: Osteopenia.  -Continue calcium and vitamin D.  -DEXA scan scheduled on 16th.    Follow-up in 3 months.     This note was partially generated using the Dragon Voice " recognition system. There may be some incorrect wording, spelling and/or spelling errors or punctuation errors that were not corrected prior to committing the note to the medical record.      Problem List Items Addressed This Visit       Osteopenia of multiple sites    Rheumatoid arthritis involving multiple sites with positive rheumatoid factor (Multi) - Primary    Relevant Orders    C-Reactive Protein    Sedimentation Rate            Active Ambulatory Problems     Diagnosis Date Noted    Acute bronchitis 04/27/2023    Adenomatous polyp of sigmoid colon 04/27/2023    Adenomatous polyps 04/27/2023    Ankle swelling 04/27/2023    Arthritis of left elbow 04/27/2023    Chronic diarrhea 04/27/2023    Depression 04/27/2023    Essential hypertension 04/27/2023    Frequency-urgency syndrome 04/27/2023    Overactive bladder 04/27/2023    Hypercalcemia 04/27/2023    Injury of left knee 04/27/2023    Injury of toe on right foot 04/27/2023    Lipoma of right lower extremity 04/27/2023    Lumbago 04/27/2023    Mixed hyperlipidemia 04/27/2023    Nocturia 04/27/2023    Osteopenia of multiple sites 04/27/2023    Primary hyperparathyroidism (Multi) 04/27/2023    Recurrent major depressive disorder, in full remission (CMS-[facility]) 04/27/2023    Shoulder arthritis 04/27/2023    Right foot strain, initial encounter 04/27/2023    Right shoulder pain 04/27/2023    Shoulder impingement, right 04/27/2023    Sore throat 04/27/2023    Status post total replacement of right shoulder 04/27/2023    Strain of left knee and leg 04/27/2023    Tonsillitis 04/27/2023    Tubular adenoma of colon 04/27/2023    Yeast infection of the skin 04/27/2023    Bilateral hearing loss due to cerumen impaction 06/30/2023    B12 deficiency 06/30/2023    Impaired fasting blood sugar 06/30/2023    Vitamin D deficiency 06/30/2023    Endometrial cancer (Multi) 10/22/2023    H/O: hysterectomy 10/22/2023    Urge incontinence 10/22/2023    Primary localized osteoarthritis  of left pelvic region and thigh 06/01/2010    Morbid obesity with BMI of 40.0-44.9, adult (Multi) 10/22/2023    Rheumatoid arthritis involving multiple sites with positive rheumatoid factor (Multi) 10/25/2023    Pharyngitis 03/25/2024    Laryngitis acute, spasmodic 03/25/2024     Resolved Ambulatory Problems     Diagnosis Date Noted    Rheumatoid arthritis 04/27/2023     Past Medical History:   Diagnosis Date    Hypercholesteremia     Hypertension     Personal history of malignant neoplasm of other parts of uterus     Personal history of other diseases of the circulatory system     Personal history of other diseases of the musculoskeletal system and connective tissue     Personal history of other diseases of the musculoskeletal system and connective tissue     Personal history of other specified conditions     Unspecified osteoarthritis, unspecified site     Uterine cancer (Multi)        Family History   Problem Relation Name Age of Onset    Hypertension Father      Cancer Father      Diabetes type II Father         Past Surgical History:   Procedure Laterality Date    COLONOSCOPY      HYSTERECTOMY      OTHER SURGICAL HISTORY  10/23/2019    Tubal ligation    OTHER SURGICAL HISTORY  10/23/2019    Dilation and curettage    OTHER SURGICAL HISTORY  10/23/2019    Total hysterectomy with removal of both tubes and ovaries    OTHER SURGICAL HISTORY  10/23/2019    Hip surgery    OTHER SURGICAL HISTORY  10/23/2019    Knee replacement    OTHER SURGICAL HISTORY  02/17/2020    Parathyroidectomy       Social History     Tobacco Use   Smoking Status Never   Smokeless Tobacco Never       Allergies  Patient has no known allergies.    Current Meds  Current Outpatient Medications   Medication Instructions    Actemra 4 mg/kg, intravenous, Every 28 days, Every 4 weeks    citalopram (CELEXA) 40 mg, oral, Daily    Gemtesa 75 mg, oral, Nightly    leflunomide (ARAVA) 20 mg, oral, Daily    lisinopriL-hydrochlorothiazide 20-25 mg tablet 1  tablet, oral, Daily    metoprolol tartrate (LOPRESSOR) 25 mg, oral, Daily    naproxen sodium (ALEVE) 220 mg, 2 times daily    rosuvastatin (CRESTOR) 10 mg, oral, Nightly    solifenacin (VESICARE) 10 mg, oral, Daily, Swallow tablet whole; do not crush, chew, or split.    VITAMIN B COMPLEX ORAL 1 tablet, Daily RT        Lab Results   Component Value Date    RF 16 (H) 04/05/2022    SEDRATE <1 10/08/2024    CRP <0.10 10/08/2024    URICACID 4.2 02/15/2024             Jerel Kolb MD

## 2024-12-11 NOTE — PATIENT INSTRUCTIONS
We will continue Actemra infusion every 4 weeks.    Continue leflunomide 20 mg daily.  Take calcium and vitamin D.  Follow-up in 3 months.

## 2024-12-16 ENCOUNTER — HOSPITAL ENCOUNTER (OUTPATIENT)
Dept: RADIOLOGY | Facility: CLINIC | Age: 69
Discharge: HOME | End: 2024-12-16
Payer: MEDICARE

## 2024-12-16 DIAGNOSIS — M85.89 OSTEOPENIA OF MULTIPLE SITES: ICD-10-CM

## 2024-12-16 PROCEDURE — 77080 DXA BONE DENSITY AXIAL: CPT | Performed by: RADIOLOGY

## 2024-12-16 PROCEDURE — 77080 DXA BONE DENSITY AXIAL: CPT

## 2025-01-06 ENCOUNTER — TELEPHONE (OUTPATIENT)
Dept: RHEUMATOLOGY | Facility: CLINIC | Age: 70
End: 2025-01-06
Payer: MEDICARE

## 2025-01-06 DIAGNOSIS — M05.79 RHEUMATOID ARTHRITIS INVOLVING MULTIPLE SITES WITH POSITIVE RHEUMATOID FACTOR (MULTI): Primary | ICD-10-CM

## 2025-01-06 RX ORDER — LEFLUNOMIDE 20 MG/1
20 TABLET ORAL DAILY
Qty: 90 TABLET | Refills: 0 | Status: SHIPPED | OUTPATIENT
Start: 2025-01-06 | End: 2025-04-06

## 2025-01-06 RX ORDER — LEFLUNOMIDE 20 MG/1
20 TABLET ORAL DAILY
COMMUNITY
End: 2025-01-06 | Stop reason: SDUPTHER

## 2025-01-06 NOTE — TELEPHONE ENCOUNTER
leflunomide 20 mg is it okay for her to take with the actemra when I went to put it in the chart it hard stopped me

## 2025-01-08 ENCOUNTER — APPOINTMENT (OUTPATIENT)
Dept: INFUSION THERAPY | Facility: HOSPITAL | Age: 70
End: 2025-01-08
Payer: MEDICARE

## 2025-01-13 ENCOUNTER — TELEPHONE (OUTPATIENT)
Dept: PRIMARY CARE | Facility: CLINIC | Age: 70
End: 2025-01-13

## 2025-01-13 ENCOUNTER — LAB (OUTPATIENT)
Dept: LAB | Facility: LAB | Age: 70
End: 2025-01-13
Payer: MEDICARE

## 2025-01-13 DIAGNOSIS — I10 ESSENTIAL HYPERTENSION: ICD-10-CM

## 2025-01-13 DIAGNOSIS — E53.8 VITAMIN B12 DEFICIENCY: ICD-10-CM

## 2025-01-13 DIAGNOSIS — Z13.29 THYROID DISORDER SCREEN: ICD-10-CM

## 2025-01-13 DIAGNOSIS — E55.9 VITAMIN D DEFICIENCY: ICD-10-CM

## 2025-01-13 DIAGNOSIS — M05.79 RHEUMATOID ARTHRITIS INVOLVING MULTIPLE SITES WITH POSITIVE RHEUMATOID FACTOR (MULTI): ICD-10-CM

## 2025-01-13 DIAGNOSIS — E78.2 MIXED HYPERLIPIDEMIA: ICD-10-CM

## 2025-01-13 LAB
25(OH)D3 SERPL-MCNC: 26 NG/ML (ref 30–100)
ALBUMIN SERPL BCP-MCNC: 3.9 G/DL (ref 3.4–5)
ALP SERPL-CCNC: 52 U/L (ref 33–136)
ALT SERPL W P-5'-P-CCNC: 25 U/L (ref 7–45)
ANION GAP SERPL CALC-SCNC: 9 MMOL/L (ref 10–20)
AST SERPL W P-5'-P-CCNC: 20 U/L (ref 9–39)
BASOPHILS # BLD AUTO: 0.11 X10*3/UL (ref 0–0.1)
BASOPHILS NFR BLD AUTO: 1.8 %
BILIRUB SERPL-MCNC: 0.6 MG/DL (ref 0–1.2)
BUN SERPL-MCNC: 18 MG/DL (ref 6–23)
CALCIUM SERPL-MCNC: 9.1 MG/DL (ref 8.6–10.3)
CHLORIDE SERPL-SCNC: 106 MMOL/L (ref 98–107)
CHOLEST SERPL-MCNC: 264 MG/DL (ref 0–199)
CHOLESTEROL/HDL RATIO: 4.2
CO2 SERPL-SCNC: 30 MMOL/L (ref 21–32)
CREAT SERPL-MCNC: 0.93 MG/DL (ref 0.5–1.05)
CRP SERPL-MCNC: <0.1 MG/DL
EGFRCR SERPLBLD CKD-EPI 2021: 67 ML/MIN/1.73M*2
EOSINOPHIL # BLD AUTO: 0.13 X10*3/UL (ref 0–0.7)
EOSINOPHIL NFR BLD AUTO: 2.2 %
ERYTHROCYTE [DISTWIDTH] IN BLOOD BY AUTOMATED COUNT: 14.7 % (ref 11.5–14.5)
ERYTHROCYTE [SEDIMENTATION RATE] IN BLOOD BY WESTERGREN METHOD: 1 MM/H (ref 0–30)
GLUCOSE SERPL-MCNC: 84 MG/DL (ref 74–99)
HCT VFR BLD AUTO: 40 % (ref 36–46)
HDLC SERPL-MCNC: 62.4 MG/DL
HGB BLD-MCNC: 13 G/DL (ref 12–16)
IMM GRANULOCYTES # BLD AUTO: 0.02 X10*3/UL (ref 0–0.7)
IMM GRANULOCYTES NFR BLD AUTO: 0.3 % (ref 0–0.9)
LDLC SERPL CALC-MCNC: 156 MG/DL
LYMPHOCYTES # BLD AUTO: 1.21 X10*3/UL (ref 1.2–4.8)
LYMPHOCYTES NFR BLD AUTO: 20 %
MCH RBC QN AUTO: 30.8 PG (ref 26–34)
MCHC RBC AUTO-ENTMCNC: 32.5 G/DL (ref 32–36)
MCV RBC AUTO: 95 FL (ref 80–100)
MONOCYTES # BLD AUTO: 0.76 X10*3/UL (ref 0.1–1)
MONOCYTES NFR BLD AUTO: 12.6 %
NEUTROPHILS # BLD AUTO: 3.81 X10*3/UL (ref 1.2–7.7)
NEUTROPHILS NFR BLD AUTO: 63.1 %
NON HDL CHOLESTEROL: 202 MG/DL (ref 0–149)
NRBC BLD-RTO: 0 /100 WBCS (ref 0–0)
PLATELET # BLD AUTO: 254 X10*3/UL (ref 150–450)
POTASSIUM SERPL-SCNC: 4.2 MMOL/L (ref 3.5–5.3)
PROT SERPL-MCNC: 5.6 G/DL (ref 6.4–8.2)
RBC # BLD AUTO: 4.22 X10*6/UL (ref 4–5.2)
SODIUM SERPL-SCNC: 141 MMOL/L (ref 136–145)
TRIGL SERPL-MCNC: 230 MG/DL (ref 0–149)
TSH SERPL-ACNC: 0.9 MIU/L (ref 0.44–3.98)
VIT B12 SERPL-MCNC: 533 PG/ML (ref 211–911)
VLDL: 46 MG/DL (ref 0–40)
WBC # BLD AUTO: 6 X10*3/UL (ref 4.4–11.3)

## 2025-01-13 PROCEDURE — 86140 C-REACTIVE PROTEIN: CPT

## 2025-01-13 PROCEDURE — 82607 VITAMIN B-12: CPT

## 2025-01-13 PROCEDURE — 82306 VITAMIN D 25 HYDROXY: CPT

## 2025-01-13 PROCEDURE — 80061 LIPID PANEL: CPT

## 2025-01-13 PROCEDURE — 80053 COMPREHEN METABOLIC PANEL: CPT

## 2025-01-13 PROCEDURE — 85652 RBC SED RATE AUTOMATED: CPT

## 2025-01-13 PROCEDURE — 86431 RHEUMATOID FACTOR QUANT: CPT

## 2025-01-13 PROCEDURE — 85025 COMPLETE CBC W/AUTO DIFF WBC: CPT

## 2025-01-13 PROCEDURE — 84443 ASSAY THYROID STIM HORMONE: CPT

## 2025-01-13 NOTE — TELEPHONE ENCOUNTER
Liliana from lab services called on a cancelled lab for the patient. The albumin-creatinine lab was cancelled due to improper collection.

## 2025-01-14 ENCOUNTER — LAB (OUTPATIENT)
Dept: LAB | Facility: LAB | Age: 70
End: 2025-01-14
Payer: MEDICARE

## 2025-01-14 DIAGNOSIS — E78.2 MIXED HYPERLIPIDEMIA: ICD-10-CM

## 2025-01-14 DIAGNOSIS — I10 ESSENTIAL HYPERTENSION: ICD-10-CM

## 2025-01-14 DIAGNOSIS — Z13.29 THYROID DISORDER SCREEN: ICD-10-CM

## 2025-01-14 DIAGNOSIS — E55.9 VITAMIN D DEFICIENCY: ICD-10-CM

## 2025-01-14 DIAGNOSIS — E53.8 VITAMIN B12 DEFICIENCY: ICD-10-CM

## 2025-01-14 DIAGNOSIS — M05.79 RHEUMATOID ARTHRITIS INVOLVING MULTIPLE SITES WITH POSITIVE RHEUMATOID FACTOR (MULTI): ICD-10-CM

## 2025-01-14 LAB
CREAT UR-MCNC: 142.1 MG/DL (ref 20–320)
MICROALBUMIN UR-MCNC: <7 MG/L
MICROALBUMIN/CREAT UR: NORMAL MG/G{CREAT}
RHEUMATOID FACT SER NEPH-ACNC: 13 IU/ML (ref 0–15)

## 2025-01-14 PROCEDURE — 82043 UR ALBUMIN QUANTITATIVE: CPT

## 2025-01-14 PROCEDURE — 82570 ASSAY OF URINE CREATININE: CPT

## 2025-01-14 NOTE — TELEPHONE ENCOUNTER
Left patient a voicemail that due to the lab error she will need to repeat the albumin-creatinine urine test.

## 2025-01-16 DIAGNOSIS — M05.79 RHEUMATOID ARTHRITIS INVOLVING MULTIPLE SITES WITH POSITIVE RHEUMATOID FACTOR (MULTI): Primary | ICD-10-CM

## 2025-01-17 ENCOUNTER — INFUSION (OUTPATIENT)
Dept: INFUSION THERAPY | Facility: HOSPITAL | Age: 70
End: 2025-01-17
Payer: MEDICARE

## 2025-01-17 VITALS
HEART RATE: 52 BPM | DIASTOLIC BLOOD PRESSURE: 67 MMHG | SYSTOLIC BLOOD PRESSURE: 152 MMHG | OXYGEN SATURATION: 97 % | TEMPERATURE: 97 F | RESPIRATION RATE: 16 BRPM

## 2025-01-17 DIAGNOSIS — M05.79 RHEUMATOID ARTHRITIS INVOLVING MULTIPLE SITES WITH POSITIVE RHEUMATOID FACTOR (MULTI): ICD-10-CM

## 2025-01-17 PROCEDURE — 96365 THER/PROPH/DIAG IV INF INIT: CPT | Mod: INF

## 2025-01-17 PROCEDURE — 2500000004 HC RX 250 GENERAL PHARMACY W/ HCPCS (ALT 636 FOR OP/ED): Performed by: INTERNAL MEDICINE

## 2025-01-17 RX ORDER — DIPHENHYDRAMINE HYDROCHLORIDE 50 MG/ML
50 INJECTION INTRAMUSCULAR; INTRAVENOUS AS NEEDED
Status: DISCONTINUED | OUTPATIENT
Start: 2025-01-17 | End: 2025-01-17 | Stop reason: HOSPADM

## 2025-01-17 RX ORDER — ALBUTEROL SULFATE 0.83 MG/ML
3 SOLUTION RESPIRATORY (INHALATION) AS NEEDED
OUTPATIENT
Start: 2025-02-05

## 2025-01-17 RX ORDER — FAMOTIDINE 10 MG/ML
20 INJECTION INTRAVENOUS ONCE AS NEEDED
Status: DISCONTINUED | OUTPATIENT
Start: 2025-01-17 | End: 2025-01-17 | Stop reason: HOSPADM

## 2025-01-17 RX ORDER — DIPHENHYDRAMINE HYDROCHLORIDE 50 MG/ML
50 INJECTION INTRAMUSCULAR; INTRAVENOUS AS NEEDED
OUTPATIENT
Start: 2025-02-05

## 2025-01-17 RX ORDER — EPINEPHRINE 0.3 MG/.3ML
0.3 INJECTION SUBCUTANEOUS EVERY 5 MIN PRN
OUTPATIENT
Start: 2025-02-05

## 2025-01-17 RX ORDER — FAMOTIDINE 10 MG/ML
20 INJECTION INTRAVENOUS ONCE AS NEEDED
OUTPATIENT
Start: 2025-02-05

## 2025-01-17 RX ORDER — HEPARIN SODIUM,PORCINE/PF 10 UNIT/ML
50 SYRINGE (ML) INTRAVENOUS AS NEEDED
OUTPATIENT
Start: 2025-01-17

## 2025-01-17 RX ORDER — EPINEPHRINE 0.3 MG/.3ML
0.3 INJECTION SUBCUTANEOUS EVERY 5 MIN PRN
Status: DISCONTINUED | OUTPATIENT
Start: 2025-01-17 | End: 2025-01-17 | Stop reason: HOSPADM

## 2025-01-17 RX ORDER — ALBUTEROL SULFATE 0.83 MG/ML
3 SOLUTION RESPIRATORY (INHALATION) AS NEEDED
Status: DISCONTINUED | OUTPATIENT
Start: 2025-01-17 | End: 2025-01-17 | Stop reason: HOSPADM

## 2025-01-17 RX ADMIN — TOCILIZUMAB 452 MG: 20 INJECTION, SOLUTION, CONCENTRATE INTRAVENOUS at 13:41

## 2025-01-17 ASSESSMENT — ENCOUNTER SYMPTOMS
DEPRESSION: 0
LOSS OF SENSATION IN FEET: 0
OCCASIONAL FEELINGS OF UNSTEADINESS: 0

## 2025-01-21 ENCOUNTER — OFFICE VISIT (OUTPATIENT)
Facility: HOSPITAL | Age: 70
End: 2025-01-21
Payer: MEDICARE

## 2025-01-21 VITALS — DIASTOLIC BLOOD PRESSURE: 78 MMHG | SYSTOLIC BLOOD PRESSURE: 151 MMHG

## 2025-01-21 DIAGNOSIS — N32.81 OAB (OVERACTIVE BLADDER): Primary | ICD-10-CM

## 2025-01-21 DIAGNOSIS — N95.1 VASOMOTOR SYMPTOMS DUE TO MENOPAUSE: ICD-10-CM

## 2025-01-21 PROCEDURE — 3078F DIAST BP <80 MM HG: CPT | Performed by: STUDENT IN AN ORGANIZED HEALTH CARE EDUCATION/TRAINING PROGRAM

## 2025-01-21 PROCEDURE — 3077F SYST BP >= 140 MM HG: CPT | Performed by: STUDENT IN AN ORGANIZED HEALTH CARE EDUCATION/TRAINING PROGRAM

## 2025-01-21 PROCEDURE — 1159F MED LIST DOCD IN RCRD: CPT | Performed by: STUDENT IN AN ORGANIZED HEALTH CARE EDUCATION/TRAINING PROGRAM

## 2025-01-21 PROCEDURE — 1123F ACP DISCUSS/DSCN MKR DOCD: CPT | Performed by: STUDENT IN AN ORGANIZED HEALTH CARE EDUCATION/TRAINING PROGRAM

## 2025-01-21 PROCEDURE — 99214 OFFICE O/P EST MOD 30 MIN: CPT | Performed by: STUDENT IN AN ORGANIZED HEALTH CARE EDUCATION/TRAINING PROGRAM

## 2025-01-21 PROCEDURE — 99204 OFFICE O/P NEW MOD 45 MIN: CPT | Performed by: STUDENT IN AN ORGANIZED HEALTH CARE EDUCATION/TRAINING PROGRAM

## 2025-01-21 ASSESSMENT — ENCOUNTER SYMPTOMS
DEPRESSION: 0
OCCASIONAL FEELINGS OF UNSTEADINESS: 0
LOSS OF SENSATION IN FEET: 0

## 2025-01-21 NOTE — PROGRESS NOTES
"HISTORY OF PRESENT ILLNESS:  Penny Rosa is a 69 y.o. female who presents today for a follow up visit. She has been seeing Daysi Blackwell PA-C for her urinary symptoms. She was given a medication, Gemtesa, and is doing well for that.          Past Medical History  She has a past medical history of Hypercholesteremia, Hypertension, Personal history of malignant neoplasm of other parts of uterus, Personal history of other diseases of the circulatory system, Personal history of other diseases of the musculoskeletal system and connective tissue, Personal history of other diseases of the musculoskeletal system and connective tissue, Personal history of other specified conditions, Unspecified osteoarthritis, unspecified site, and Uterine cancer (Multi).    Surgical History  She has a past surgical history that includes Other surgical history (10/23/2019); Other surgical history (10/23/2019); Other surgical history (10/23/2019); Other surgical history (10/23/2019); Other surgical history (10/23/2019); Other surgical history (02/17/2020); Hysterectomy; and Colonoscopy.     Social History  She reports that she has never smoked. She has never used smokeless tobacco. She reports that she does not drink alcohol and does not use drugs.    Family History  Family History   Problem Relation Name Age of Onset    Hypertension Father      Cancer Father      Diabetes type II Father          Allergies  Patient has no known allergies.      A comprehensive 10+ review of systems was negative except for: see hpi                          PHYSICAL EXAMINATION:  BP Readings from Last 3 Encounters:   01/21/25 151/78   01/17/25 152/67   12/11/24 120/76      Wt Readings from Last 3 Encounters:   12/11/24 114 kg (252 lb)   12/06/24 114 kg (252 lb)   10/11/24 114 kg (250 lb 10.6 oz)      BMI: Estimated body mass index is 44.64 kg/m² as calculated from the following:    Height as of 12/11/24: 1.6 m (5' 3\").    Weight as of 12/11/24: 114 " "kg (252 lb).  BSA: Estimated body surface area is 2.25 meters squared as calculated from the following:    Height as of 12/11/24: 1.6 m (5' 3\").    Weight as of 12/11/24: 114 kg (252 lb).  HEENT: Normocephalic, atraumatic, PER EOMI, nonicteric, trachea normal, thyroid normal, oropharynx normal.  CARDIAC: regular rate & rhythm, S1 & S2 normal.  No heaves, thrills, gallops or murmurs.  LUNGS: Clear to auscultation, no spinal or CV tenderness.  EXTREMITIES: No evidence of cyanosis, clubbing or edema.               Assessment:  Penny Rosa is a 69 y.o. presents with SHAN/OAB, Vasomotor symptoms. Hx of a hysterectomy for cancer of the uterine in 2016 or 2017. In remission.      SHAN: Urge dominant  -discussed mechanism of UUI and JOSE, and treatment options for both including PFT, pessary, sling for JOSE and PFT, pharmacotherapy and third-line therapy for OAB     -Failed Oxybutynin  -Continue Gemtesa once nightly before bed  - referral for Gemtesa     Vasomotor symptoms  -well controlled      Follow up in 1 year        All questions and concerns were answered and addressed.  The patient expressed understanding and agrees with the plan.     Neri Peng MD    Scribe Attestation  By signing my name below, I, Dunia Linder, Scribe   attest that this documentation has been prepared under the direction and in the presence of Neri Peng MD.  "

## 2025-01-22 DIAGNOSIS — M05.79 RHEUMATOID ARTHRITIS INVOLVING MULTIPLE SITES WITH POSITIVE RHEUMATOID FACTOR (MULTI): Primary | ICD-10-CM

## 2025-01-22 RX ORDER — ALBUTEROL SULFATE 0.83 MG/ML
3 SOLUTION RESPIRATORY (INHALATION) AS NEEDED
OUTPATIENT
Start: 2025-02-14

## 2025-01-22 RX ORDER — DIPHENHYDRAMINE HYDROCHLORIDE 50 MG/ML
50 INJECTION INTRAMUSCULAR; INTRAVENOUS AS NEEDED
OUTPATIENT
Start: 2025-02-14

## 2025-01-22 RX ORDER — EPINEPHRINE 0.3 MG/.3ML
0.3 INJECTION SUBCUTANEOUS EVERY 5 MIN PRN
OUTPATIENT
Start: 2025-02-14

## 2025-01-22 RX ORDER — FAMOTIDINE 10 MG/ML
20 INJECTION INTRAVENOUS ONCE AS NEEDED
OUTPATIENT
Start: 2025-02-14

## 2025-01-31 ENCOUNTER — APPOINTMENT (OUTPATIENT)
Dept: PRIMARY CARE | Facility: CLINIC | Age: 70
End: 2025-01-31
Payer: MEDICARE

## 2025-01-31 DIAGNOSIS — M05.79 RHEUMATOID ARTHRITIS INVOLVING MULTIPLE SITES WITH POSITIVE RHEUMATOID FACTOR (MULTI): ICD-10-CM

## 2025-01-31 DIAGNOSIS — E55.9 VITAMIN D DEFICIENCY: Primary | ICD-10-CM

## 2025-01-31 DIAGNOSIS — I10 ESSENTIAL HYPERTENSION: ICD-10-CM

## 2025-01-31 DIAGNOSIS — F33.1 MODERATE EPISODE OF RECURRENT MAJOR DEPRESSIVE DISORDER: ICD-10-CM

## 2025-01-31 DIAGNOSIS — R73.01 IFG (IMPAIRED FASTING GLUCOSE): ICD-10-CM

## 2025-01-31 DIAGNOSIS — C54.1 ENDOMETRIAL CANCER (MULTI): ICD-10-CM

## 2025-01-31 DIAGNOSIS — E78.2 MIXED HYPERLIPIDEMIA: ICD-10-CM

## 2025-01-31 PROCEDURE — 1123F ACP DISCUSS/DSCN MKR DOCD: CPT | Performed by: NURSE PRACTITIONER

## 2025-01-31 PROCEDURE — 99213 OFFICE O/P EST LOW 20 MIN: CPT | Performed by: NURSE PRACTITIONER

## 2025-01-31 RX ORDER — ROSUVASTATIN CALCIUM 10 MG/1
10 TABLET, COATED ORAL NIGHTLY
Qty: 30 TABLET | Refills: 0 | Status: SHIPPED | OUTPATIENT
Start: 2025-01-31 | End: 2025-03-02

## 2025-01-31 RX ORDER — METOPROLOL TARTRATE 25 MG/1
25 TABLET, FILM COATED ORAL DAILY
Qty: 90 TABLET | Refills: 1 | Status: SHIPPED | OUTPATIENT
Start: 2025-01-31

## 2025-01-31 NOTE — PROGRESS NOTES
Subjective   Patient ID: Penny Rosa is a 69 y.o. female who presents for Hyperlipidemia, Hypertension, and Arthritis.    Rheumatoid Arthritis: managed by specialist. No acute concerns.     HLD: . Patient on statin, total cholesterol 264.      HTN: Well controlled . Normal home readings. Needs refill on lisinopril-hydrochlorothiazide   GFR 67.     An interactive audio and video telecommunication system which permits real time communications between the patient (at the originating site) and provider (at the distant site) was utilized to provide this telehealth service.   Verbal consent was requested and obtained from Penny Rosa on this date, 02/03/25 for a telehealth visit    .Recent Results (from the past 6 weeks)  -Comprehensive metabolic panel:   Collection Time: 01/13/25  1:12 PM       Result                      Value             Ref Range           Glucose                     84                74 - 99 mg/dL       Sodium                      141               136 - 145 mm*       Potassium                   4.2               3.5 - 5.3 mm*       Chloride                    106               98 - 107 mmo*       Bicarbonate                 30                21 - 32 mmol*       Anion Gap                   9 (L)             10 - 20 mmol*       Urea Nitrogen               18                6 - 23 mg/dL        Creatinine                  0.93              0.50 - 1.05 *       eGFR                        67                >60 mL/min/1*       Calcium                     9.1               8.6 - 10.3 m*       Albumin                     3.9               3.4 - 5.0 g/*       Alkaline Phosphatase        52                33 - 136 U/L        Total Protein               5.6 (L)           6.4 - 8.2 g/*       AST                         20                9 - 39 U/L          Bilirubin, Total            0.6               0.0 - 1.2 mg*       ALT                         25                7 - 45 U/L     -Tsh  With Reflex To Free T4 If Abnormal:   Collection Time: 01/13/25  1:12 PM       Result                      Value             Ref Range           Thyroid Stimulating Ho*     0.90              0.44 - 3.98 *  -Lipid panel:   Collection Time: 01/13/25  1:12 PM       Result                      Value             Ref Range           Cholesterol                 264 (H)           0 - 199 mg/dL       HDL-Cholesterol             62.4              mg/dL               Cholesterol/HDL Ratio       4.2                                   LDL Calculated              156 (H)           <=99 mg/dL          VLDL                        46 (H)            0 - 40 mg/dL        Triglycerides               230 (H)           0 - 149 mg/dL       Non HDL Cholesterol         202 (H)           0 - 149 mg/dL  -Vitamin D 25-Hydroxy,Total (for eval of Vitamin D levels):   Collection Time: 01/13/25  1:12 PM       Result                      Value             Ref Range           Vitamin D, 25-Hydroxy,*     26 (L)            30 - 100 ng/*  -CBC and Auto Differential:   Collection Time: 01/13/25  1:12 PM       Result                      Value             Ref Range           WBC                         6.0               4.4 - 11.3 x*       nRBC                        0.0               0.0 - 0.0 /1*       RBC                         4.22              4.00 - 5.20 *       Hemoglobin                  13.0              12.0 - 16.0 *       Hematocrit                  40.0              36.0 - 46.0 %       MCV                         95                80 - 100 fL         MCH                         30.8              26.0 - 34.0 *       MCHC                        32.5              32.0 - 36.0 *       RDW                         14.7 (H)          11.5 - 14.5 %       Platelets                   254               150 - 450 x1*       Neutrophils %               63.1              40.0 - 80.0 %       Immature Granulocytes *     0.3               0.0 - 0.9 %          Lymphocytes %               20.0              13.0 - 44.0 %       Monocytes %                 12.6              2.0 - 10.0 %        Eosinophils %               2.2               0.0 - 6.0 %         Basophils %                 1.8               0.0 - 2.0 %         Neutrophils Absolute        3.81              1.20 - 7.70 *       Immature Granulocytes *     0.02              0.00 - 0.70 *       Lymphocytes Absolute        1.21              1.20 - 4.80 *       Monocytes Absolute          0.76              0.10 - 1.00 *       Eosinophils Absolute        0.13              0.00 - 0.70 *       Basophils Absolute          0.11 (H)          0.00 - 0.10 *  -Vitamin B12:   Collection Time: 01/13/25  1:12 PM       Result                      Value             Ref Range           Vitamin B12                 533               211 - 911 pg*  -Rheumatoid Factor:   Collection Time: 01/13/25  1:12 PM       Result                      Value             Ref Range           Rheumatoid Factor           13                0 - 15 IU/mL   -C-Reactive Protein:   Collection Time: 01/13/25  1:12 PM       Result                      Value             Ref Range           C-Reactive Protein          <0.10             <1.00 mg/dL    -Sedimentation Rate:   Collection Time: 01/13/25  1:12 PM       Result                      Value             Ref Range           Sedimentation Rate          1                 0 - 30 mm/h    -Albumin-Creatinine Ratio, Urine Random:   Collection Time: 01/14/25 12:01 PM       Result                      Value             Ref Range           Albumin, Urine Random       <7.0              Not establis*       Creatinine, Urine Rand*     142.1             20.0 - 320.0*       Albumin/Creatinine Rat*                                              Review of Systems   Constitutional: Negative.  Negative for activity change, chills, fatigue and fever.   Respiratory: Negative.  Negative for apnea, cough and shortness of breath.     Cardiovascular:  Negative for chest pain and palpitations.   Gastrointestinal:  Negative for abdominal pain, nausea and vomiting.   Neurological:  Negative for dizziness, weakness and headaches.   Psychiatric/Behavioral:  Negative for confusion. The patient is not nervous/anxious.        Objective   There were no vitals taken for this visit.    Physical Exam    Assessment/Plan   Problem List Items Addressed This Visit             ICD-10-CM    Essential hypertension I10     .Discussed DASH diet and dietary sodium restrictions  Continue/Increase dietary efforts and physical activity    Continue current poc   Call for consistently elevated bp >140/90           Relevant Medications    metoprolol tartrate (Lopressor) 25 mg tablet    Other Relevant Orders    CBC and Auto Differential    Albumin-Creatinine Ratio, Urine Random    Hemoglobin A1C    Comprehensive Metabolic Panel    Lipid Panel    TSH with reflex to Free T4 if abnormal    Vitamin D 25-Hydroxy,Total (for eval of Vitamin D levels)    Mixed hyperlipidemia E78.2     LDL elevated   Encouraged adherence to statin   Low saturated fat diet  Lipid panel 6 months         Relevant Medications    rosuvastatin (Crestor) 10 mg tablet    Other Relevant Orders    CBC and Auto Differential    Albumin-Creatinine Ratio, Urine Random    Hemoglobin A1C    Comprehensive Metabolic Panel    Lipid Panel    TSH with reflex to Free T4 if abnormal    Vitamin D 25-Hydroxy,Total (for eval of Vitamin D levels)    Vitamin D deficiency - Primary E55.9     Continue current poc            Relevant Orders    Vitamin D 25-Hydroxy,Total (for eval of Vitamin D levels)    Endometrial cancer (Multi) C54.1    Rheumatoid arthritis involving multiple sites with positive rheumatoid factor (Multi) M05.79     Well controlled   Continue current poc   Follow up with specialist          Moderate episode of recurrent major depressive disorder F33.1     Well controlled   Continue current poc   Follow up 6  months          Other Visit Diagnoses         Codes    IFG (impaired fasting glucose)     R73.01    Relevant Orders    Hemoglobin A1C    Body mass index (BMI) 40.0-44.9, adult (Multi)     Z68.41

## 2025-02-03 PROBLEM — F33.1 MODERATE EPISODE OF RECURRENT MAJOR DEPRESSIVE DISORDER: Status: ACTIVE | Noted: 2025-02-03

## 2025-02-03 ASSESSMENT — ENCOUNTER SYMPTOMS
CONFUSION: 0
DIZZINESS: 0
CHILLS: 0
PALPITATIONS: 0
ABDOMINAL PAIN: 0
COUGH: 0
RESPIRATORY NEGATIVE: 1
NAUSEA: 0
APNEA: 0
HEADACHES: 0
FATIGUE: 0
SHORTNESS OF BREATH: 0
FEVER: 0
ACTIVITY CHANGE: 0
NERVOUS/ANXIOUS: 0
VOMITING: 0
CONSTITUTIONAL NEGATIVE: 1
WEAKNESS: 0

## 2025-02-03 NOTE — ASSESSMENT & PLAN NOTE
.Discussed DASH diet and dietary sodium restrictions  Continue/Increase dietary efforts and physical activity    Continue current poc   Call for consistently elevated bp >140/90

## 2025-02-07 ENCOUNTER — TELEPHONE (OUTPATIENT)
Dept: PRIMARY CARE | Facility: CLINIC | Age: 70
End: 2025-02-07
Payer: MEDICARE

## 2025-02-07 DIAGNOSIS — F33.1 MODERATE EPISODE OF RECURRENT MAJOR DEPRESSIVE DISORDER: ICD-10-CM

## 2025-02-07 RX ORDER — CITALOPRAM 40 MG/1
40 TABLET, FILM COATED ORAL DAILY
Qty: 90 TABLET | Refills: 1 | Status: SHIPPED | OUTPATIENT
Start: 2025-02-07 | End: 2025-08-06

## 2025-02-07 NOTE — TELEPHONE ENCOUNTER
Rx Refill Request Telephone Encounter    Name:  Penny WARNER Moe  :  [id number]  Medication Name:     citalopram (CeleXA) 40 mg tablet   Route: Take 1 tablet (40 mg) by mouth once daily.              Specific Pharmacy location:  [id number] [address]   Date of last appointment:  25  Date of next appointment:    Best number to reach patient: [phone]

## 2025-02-14 ENCOUNTER — INFUSION (OUTPATIENT)
Dept: INFUSION THERAPY | Facility: HOSPITAL | Age: 70
End: 2025-02-14
Payer: MEDICARE

## 2025-02-14 VITALS
OXYGEN SATURATION: 95 % | DIASTOLIC BLOOD PRESSURE: 75 MMHG | SYSTOLIC BLOOD PRESSURE: 123 MMHG | TEMPERATURE: 97 F | HEART RATE: 51 BPM | RESPIRATION RATE: 18 BRPM

## 2025-02-14 DIAGNOSIS — M05.79 RHEUMATOID ARTHRITIS INVOLVING MULTIPLE SITES WITH POSITIVE RHEUMATOID FACTOR (MULTI): ICD-10-CM

## 2025-02-14 PROCEDURE — 96365 THER/PROPH/DIAG IV INF INIT: CPT | Mod: INF

## 2025-02-14 PROCEDURE — 2500000004 HC RX 250 GENERAL PHARMACY W/ HCPCS (ALT 636 FOR OP/ED): Performed by: INTERNAL MEDICINE

## 2025-02-14 RX ORDER — DIPHENHYDRAMINE HYDROCHLORIDE 50 MG/ML
50 INJECTION INTRAMUSCULAR; INTRAVENOUS AS NEEDED
OUTPATIENT
Start: 2025-03-14

## 2025-02-14 RX ORDER — ALBUTEROL SULFATE 0.83 MG/ML
3 SOLUTION RESPIRATORY (INHALATION) AS NEEDED
OUTPATIENT
Start: 2025-03-14

## 2025-02-14 RX ORDER — FAMOTIDINE 10 MG/ML
20 INJECTION INTRAVENOUS ONCE AS NEEDED
OUTPATIENT
Start: 2025-03-14

## 2025-02-14 RX ORDER — EPINEPHRINE 0.3 MG/.3ML
0.3 INJECTION SUBCUTANEOUS EVERY 5 MIN PRN
OUTPATIENT
Start: 2025-03-14

## 2025-02-14 RX ADMIN — TOCILIZUMAB 452 MG: 20 INJECTION, SOLUTION, CONCENTRATE INTRAVENOUS at 12:08

## 2025-03-12 ENCOUNTER — APPOINTMENT (OUTPATIENT)
Dept: RHEUMATOLOGY | Facility: CLINIC | Age: 70
End: 2025-03-12
Payer: MEDICARE

## 2025-03-21 ENCOUNTER — INFUSION (OUTPATIENT)
Dept: INFUSION THERAPY | Facility: HOSPITAL | Age: 70
End: 2025-03-21
Payer: MEDICARE

## 2025-03-21 VITALS
WEIGHT: 254.63 LBS | SYSTOLIC BLOOD PRESSURE: 147 MMHG | DIASTOLIC BLOOD PRESSURE: 78 MMHG | HEART RATE: 50 BPM | BODY MASS INDEX: 45.11 KG/M2 | OXYGEN SATURATION: 95 % | RESPIRATION RATE: 20 BRPM | TEMPERATURE: 97.7 F

## 2025-03-21 DIAGNOSIS — M05.79 RHEUMATOID ARTHRITIS INVOLVING MULTIPLE SITES WITH POSITIVE RHEUMATOID FACTOR (MULTI): Primary | ICD-10-CM

## 2025-03-21 PROCEDURE — 96365 THER/PROPH/DIAG IV INF INIT: CPT | Mod: INF

## 2025-03-21 PROCEDURE — 2500000004 HC RX 250 GENERAL PHARMACY W/ HCPCS (ALT 636 FOR OP/ED): Performed by: INTERNAL MEDICINE

## 2025-03-21 RX ORDER — HEPARIN SODIUM,PORCINE/PF 10 UNIT/ML
50 SYRINGE (ML) INTRAVENOUS AS NEEDED
OUTPATIENT
Start: 2025-03-21

## 2025-03-21 RX ORDER — EPINEPHRINE 0.3 MG/.3ML
0.3 INJECTION SUBCUTANEOUS EVERY 5 MIN PRN
OUTPATIENT
Start: 2025-04-11

## 2025-03-21 RX ORDER — FAMOTIDINE 10 MG/ML
20 INJECTION, SOLUTION INTRAVENOUS ONCE AS NEEDED
OUTPATIENT
Start: 2025-04-11

## 2025-03-21 RX ORDER — DIPHENHYDRAMINE HYDROCHLORIDE 50 MG/ML
50 INJECTION, SOLUTION INTRAMUSCULAR; INTRAVENOUS AS NEEDED
OUTPATIENT
Start: 2025-04-11

## 2025-03-21 RX ORDER — ALBUTEROL SULFATE 0.83 MG/ML
3 SOLUTION RESPIRATORY (INHALATION) AS NEEDED
OUTPATIENT
Start: 2025-04-11

## 2025-03-21 RX ADMIN — TOCILIZUMAB 452 MG: 20 INJECTION, SOLUTION, CONCENTRATE INTRAVENOUS at 11:54

## 2025-03-21 SDOH — ECONOMIC STABILITY: FOOD INSECURITY: WITHIN THE PAST 12 MONTHS, YOU WORRIED THAT YOUR FOOD WOULD RUN OUT BEFORE YOU GOT MONEY TO BUY MORE.: NEVER TRUE

## 2025-03-21 SDOH — ECONOMIC STABILITY: FOOD INSECURITY: WITHIN THE PAST 12 MONTHS, THE FOOD YOU BOUGHT JUST DIDN'T LAST AND YOU DIDN'T HAVE MONEY TO GET MORE.: NEVER TRUE

## 2025-03-21 ASSESSMENT — ENCOUNTER SYMPTOMS
OCCASIONAL FEELINGS OF UNSTEADINESS: 0
DEPRESSION: 0
LOSS OF SENSATION IN FEET: 0

## 2025-03-21 ASSESSMENT — PAIN SCALES - GENERAL: PAINLEVEL_OUTOF10: 0-NO PAIN

## 2025-04-29 ENCOUNTER — TELEPHONE (OUTPATIENT)
Dept: PRIMARY CARE | Facility: CLINIC | Age: 70
End: 2025-04-29
Payer: MEDICARE

## 2025-04-29 DIAGNOSIS — E78.2 MIXED HYPERLIPIDEMIA: ICD-10-CM

## 2025-04-29 RX ORDER — ROSUVASTATIN CALCIUM 10 MG/1
10 TABLET, COATED ORAL NIGHTLY
Qty: 30 TABLET | Refills: 0 | Status: SHIPPED | OUTPATIENT
Start: 2025-04-29 | End: 2025-05-29

## 2025-04-29 NOTE — TELEPHONE ENCOUNTER
Patient is requesting a refill on:    rosuvastatin (Crestor) 10 mg tablet   Route: Take 1 tablet (10 mg) by mouth once daily at bedtime.     Pharmacy:  [facility]    Last office visit:   1/31/2025    Next office visit:  5/9/2025

## 2025-04-30 ENCOUNTER — TELEPHONE (OUTPATIENT)
Dept: UROLOGY | Facility: CLINIC | Age: 70
End: 2025-04-30
Payer: MEDICARE

## 2025-04-30 DIAGNOSIS — N32.81 OAB (OVERACTIVE BLADDER): ICD-10-CM

## 2025-04-30 RX ORDER — SOLIFENACIN SUCCINATE 10 MG/1
10 TABLET, FILM COATED ORAL DAILY
Qty: 30 TABLET | Refills: 5 | Status: SHIPPED | OUTPATIENT
Start: 2025-04-30 | End: 2025-10-27

## 2025-04-30 NOTE — TELEPHONE ENCOUNTER
Pneny Rosa  [birth year]   refill on Solifenacin 10mg table please  pt to check her pharmacy [facility] in [place] after 6pm.

## 2025-05-07 LAB
25(OH)D3+25(OH)D2 SERPL-MCNC: 57 NG/ML (ref 30–100)
ALBUMIN SERPL-MCNC: 4.1 G/DL (ref 3.6–5.1)
ALP SERPL-CCNC: 98 U/L (ref 37–153)
ALT SERPL-CCNC: 26 U/L (ref 6–29)
ANION GAP SERPL CALCULATED.4IONS-SCNC: 9 MMOL/L (CALC) (ref 7–17)
AST SERPL-CCNC: 20 U/L (ref 10–35)
BASOPHILS # BLD AUTO: 103 CELLS/UL (ref 0–200)
BASOPHILS NFR BLD AUTO: 1.9 %
BILIRUB SERPL-MCNC: 0.6 MG/DL (ref 0.2–1.2)
BUN SERPL-MCNC: 20 MG/DL (ref 7–25)
CALCIUM SERPL-MCNC: 9.6 MG/DL (ref 8.6–10.4)
CHLORIDE SERPL-SCNC: 105 MMOL/L (ref 98–110)
CHOLEST SERPL-MCNC: 155 MG/DL
CHOLEST/HDLC SERPL: 2.5 (CALC)
CO2 SERPL-SCNC: 27 MMOL/L (ref 20–32)
CREAT SERPL-MCNC: 0.88 MG/DL (ref 0.5–1.05)
EGFRCR SERPLBLD CKD-EPI 2021: 71 ML/MIN/1.73M2
EOSINOPHIL # BLD AUTO: 119 CELLS/UL (ref 15–500)
EOSINOPHIL NFR BLD AUTO: 2.2 %
ERYTHROCYTE [DISTWIDTH] IN BLOOD BY AUTOMATED COUNT: 12.9 % (ref 11–15)
EST. AVERAGE GLUCOSE BLD GHB EST-MCNC: 105 MG/DL
EST. AVERAGE GLUCOSE BLD GHB EST-SCNC: 5.8 MMOL/L
GLUCOSE SERPL-MCNC: 106 MG/DL (ref 65–99)
HBA1C MFR BLD: 5.3 %
HCT VFR BLD AUTO: 41.7 % (ref 35–45)
HDLC SERPL-MCNC: 62 MG/DL
HGB BLD-MCNC: 13.5 G/DL (ref 11.7–15.5)
LDLC SERPL CALC-MCNC: 71 MG/DL (CALC)
LYMPHOCYTES # BLD AUTO: 956 CELLS/UL (ref 850–3900)
LYMPHOCYTES NFR BLD AUTO: 17.7 %
MCH RBC QN AUTO: 30 PG (ref 27–33)
MCHC RBC AUTO-ENTMCNC: 32.4 G/DL (ref 32–36)
MCV RBC AUTO: 92.7 FL (ref 80–100)
MONOCYTES # BLD AUTO: 556 CELLS/UL (ref 200–950)
MONOCYTES NFR BLD AUTO: 10.3 %
NEUTROPHILS # BLD AUTO: 3667 CELLS/UL (ref 1500–7800)
NEUTROPHILS NFR BLD AUTO: 67.9 %
NONHDLC SERPL-MCNC: 93 MG/DL (CALC)
PLATELET # BLD AUTO: 294 THOUSAND/UL (ref 140–400)
PMV BLD REES-ECKER: 10.6 FL (ref 7.5–12.5)
POTASSIUM SERPL-SCNC: 4 MMOL/L (ref 3.5–5.3)
PROT SERPL-MCNC: 6.4 G/DL (ref 6.1–8.1)
RBC # BLD AUTO: 4.5 MILLION/UL (ref 3.8–5.1)
SODIUM SERPL-SCNC: 141 MMOL/L (ref 135–146)
TRIGL SERPL-MCNC: 136 MG/DL
TSH SERPL-ACNC: 1.44 MIU/L (ref 0.4–4.5)
WBC # BLD AUTO: 5.4 THOUSAND/UL (ref 3.8–10.8)

## 2025-05-09 ENCOUNTER — APPOINTMENT (OUTPATIENT)
Dept: PRIMARY CARE | Facility: CLINIC | Age: 70
End: 2025-05-09
Payer: MEDICARE

## 2025-05-09 VITALS
DIASTOLIC BLOOD PRESSURE: 69 MMHG | WEIGHT: 255.8 LBS | OXYGEN SATURATION: 95 % | HEART RATE: 57 BPM | SYSTOLIC BLOOD PRESSURE: 129 MMHG | BODY MASS INDEX: 45.31 KG/M2 | TEMPERATURE: 97.3 F

## 2025-05-09 DIAGNOSIS — R73.01 IFG (IMPAIRED FASTING GLUCOSE): ICD-10-CM

## 2025-05-09 DIAGNOSIS — Z00.00 ROUTINE GENERAL MEDICAL EXAMINATION AT HEALTH CARE FACILITY: Primary | ICD-10-CM

## 2025-05-09 DIAGNOSIS — E78.2 MIXED HYPERLIPIDEMIA: ICD-10-CM

## 2025-05-09 DIAGNOSIS — Z12.31 SCREENING MAMMOGRAM FOR BREAST CANCER: ICD-10-CM

## 2025-05-09 DIAGNOSIS — E55.9 VITAMIN D DEFICIENCY: ICD-10-CM

## 2025-05-09 DIAGNOSIS — F33.1 MODERATE EPISODE OF RECURRENT MAJOR DEPRESSIVE DISORDER: ICD-10-CM

## 2025-05-09 DIAGNOSIS — I10 ESSENTIAL HYPERTENSION: ICD-10-CM

## 2025-05-09 PROCEDURE — 3078F DIAST BP <80 MM HG: CPT | Performed by: NURSE PRACTITIONER

## 2025-05-09 PROCEDURE — 3074F SYST BP LT 130 MM HG: CPT | Performed by: NURSE PRACTITIONER

## 2025-05-09 PROCEDURE — 1170F FXNL STATUS ASSESSED: CPT | Performed by: NURSE PRACTITIONER

## 2025-05-09 PROCEDURE — G0439 PPPS, SUBSEQ VISIT: HCPCS | Performed by: NURSE PRACTITIONER

## 2025-05-09 PROCEDURE — 1036F TOBACCO NON-USER: CPT | Performed by: NURSE PRACTITIONER

## 2025-05-09 PROCEDURE — 1159F MED LIST DOCD IN RCRD: CPT | Performed by: NURSE PRACTITIONER

## 2025-05-09 RX ORDER — ROSUVASTATIN CALCIUM 10 MG/1
10 TABLET, COATED ORAL NIGHTLY
Qty: 90 TABLET | Refills: 1 | Status: SHIPPED | OUTPATIENT
Start: 2025-05-09

## 2025-05-09 RX ORDER — METOPROLOL TARTRATE 25 MG/1
25 TABLET, FILM COATED ORAL DAILY
Qty: 90 TABLET | Refills: 1 | Status: SHIPPED | OUTPATIENT
Start: 2025-05-09

## 2025-05-09 RX ORDER — LISINOPRIL AND HYDROCHLOROTHIAZIDE 20; 25 MG/1; MG/1
1 TABLET ORAL DAILY
Qty: 30 TABLET | Refills: 11 | Status: SHIPPED | OUTPATIENT
Start: 2025-05-09 | End: 2026-05-09

## 2025-05-09 RX ORDER — CITALOPRAM 40 MG/1
40 TABLET, FILM COATED ORAL DAILY
Qty: 90 TABLET | Refills: 1 | Status: SHIPPED | OUTPATIENT
Start: 2025-05-09 | End: 2025-11-05

## 2025-05-09 ASSESSMENT — ENCOUNTER SYMPTOMS
HEADACHES: 0
SHORTNESS OF BREATH: 0
ACTIVITY CHANGE: 0
DEPRESSION: 0
OCCASIONAL FEELINGS OF UNSTEADINESS: 1
FATIGUE: 1
RESPIRATORY NEGATIVE: 1
PALPITATIONS: 0
NAUSEA: 0
DIZZINESS: 0
APNEA: 0
LOSS OF SENSATION IN FEET: 0
CHILLS: 0
FEVER: 0
COUGH: 0
ABDOMINAL PAIN: 0
NERVOUS/ANXIOUS: 0
VOMITING: 0
WEAKNESS: 0
ARTHRALGIAS: 1
DIARRHEA: 0
CONFUSION: 0

## 2025-05-09 ASSESSMENT — ACTIVITIES OF DAILY LIVING (ADL)
DOING_HOUSEWORK: INDEPENDENT
DRESSING: INDEPENDENT
BATHING: INDEPENDENT
TAKING_MEDICATION: INDEPENDENT
GROCERY_SHOPPING: INDEPENDENT
MANAGING_FINANCES: INDEPENDENT

## 2025-05-09 NOTE — ASSESSMENT & PLAN NOTE
Well-controlled.  Continue current plan of care  No added salt diet  Check BP daily and call for consistently elevated BP greater than 140/90  Orders:    metoprolol tartrate (Lopressor) 25 mg tablet; Take 1 tablet (25 mg) by mouth once daily.    lisinopriL-hydrochlorothiazide 20-25 mg tablet; Take 1 tablet by mouth once daily.    Comprehensive Metabolic Panel; Future    Lipid Panel; Future    Hemoglobin A1C; Future    TSH with reflex to Free T4 if abnormal; Future    Vitamin D 25-Hydroxy,Total (for eval of Vitamin D levels); Future    CBC and Auto Differential; Future

## 2025-05-09 NOTE — ASSESSMENT & PLAN NOTE
Well-controlled.  Continue current plan of care  Orders:    citalopram (CeleXA) 40 mg tablet; Take 1 tablet (40 mg) by mouth once daily.

## 2025-05-09 NOTE — PATIENT INSTRUCTIONS

## 2025-05-09 NOTE — PROGRESS NOTES
Subjective   Reason for Visit: Penny Rosa is an 70 y.o. female here for a Medicare Wellness visit.     Past Medical, Surgical, and Family History reviewed and updated in chart.    Reviewed all medications by prescribing practitioner or clinical pharmacist (such as prescriptions, OTCs, herbal therapies and supplements) and documented in the medical record.    [facility]   Rheumatoid arthritis: Managed by rheumatology.  Has history of seropositive RA, OA status post bilateral TKA and right CRISTHIAN, status post right shoulder surgery and endometrial cancer status post BENJAMIN/BSO in 2017.  Patient continues on Actemra infusion every 4 weeks and leflunomide 20 mg daily    Osteopenia: On vitamin D and calcium supplement    Complains of fatigue: Patient has complained before.  Vitamin levels are normal.  Patient treated with vitamin D for low vitamin D level but still feels fatigued.  I explained to patient that we are not finding a medical reason for fatigue and it could likely be psychosocial factors.  Patient needs to evaluate anxiety, grief, depression.    Mixed hyperlipidemia: Rosuvastatin has significantly improved lipid panel.    Recurrent major depressive disorder: Patient stable on citalopram.  No reports of increasing depression    Hypertension: Well-controlled on lisinopril-hydrochlorothiazide and metoprolol.            Patient Care Team:  KIMI Levine-CNP, DNP as PCP - General     Review of Systems   Constitutional:  Positive for fatigue. Negative for activity change, chills and fever.   Respiratory: Negative.  Negative for apnea, cough and shortness of breath.    Cardiovascular:  Negative for chest pain and palpitations.   Gastrointestinal:  Negative for abdominal pain, diarrhea, nausea and vomiting.   Musculoskeletal:  Positive for arthralgias.   Neurological:  Negative for dizziness, weakness and headaches.   Psychiatric/Behavioral:  Negative for confusion. The patient is not nervous/anxious.         Objective   Vitals:  /69   Pulse 57   Temp 36.3 °C (97.3 °F) (Temporal)   Wt 116 kg (255 lb 12.8 oz)   SpO2 95%   BMI 45.31 kg/m²       Physical Exam  Vitals reviewed.   Constitutional:       Appearance: Normal appearance.   HENT:      Head: Normocephalic.   Cardiovascular:      Rate and Rhythm: Normal rate and regular rhythm.      Pulses: Normal pulses.      Heart sounds: Normal heart sounds.   Pulmonary:      Effort: Pulmonary effort is normal. No respiratory distress.      Breath sounds: Normal breath sounds. No wheezing.   Abdominal:      General: Bowel sounds are normal.   Neurological:      General: No focal deficit present.      Mental Status: She is alert and oriented to person, place, and time.   Psychiatric:         Mood and Affect: Mood normal.         Behavior: Behavior normal.         Assessment & Plan  Moderate episode of recurrent major depressive disorder  Well-controlled.  Continue current plan of care  Orders:    citalopram (CeleXA) 40 mg tablet; Take 1 tablet (40 mg) by mouth once daily.    Mixed hyperlipidemia  LDL at goal.  Continue statin  Orders:    rosuvastatin (Crestor) 10 mg tablet; Take 1 tablet (10 mg) by mouth once daily at bedtime.    Comprehensive Metabolic Panel; Future    Lipid Panel; Future    Hemoglobin A1C; Future    TSH with reflex to Free T4 if abnormal; Future    Vitamin D 25-Hydroxy,Total (for eval of Vitamin D levels); Future    CBC and Auto Differential; Future    Essential hypertension  Well-controlled.  Continue current plan of care  No added salt diet  Check BP daily and call for consistently elevated BP greater than 140/90  Orders:    metoprolol tartrate (Lopressor) 25 mg tablet; Take 1 tablet (25 mg) by mouth once daily.    lisinopriL-hydrochlorothiazide 20-25 mg tablet; Take 1 tablet by mouth once daily.    Comprehensive Metabolic Panel; Future    Lipid Panel; Future    Hemoglobin A1C; Future    TSH with reflex to Free T4 if abnormal; Future    Vitamin  D 25-Hydroxy,Total (for eval of Vitamin D levels); Future    CBC and Auto Differential; Future    IFG (impaired fasting glucose)    Orders:    Hemoglobin A1C; Future    Vitamin D deficiency  Continue supplement.  Normal vitamin D level  Recheck with next major lab panel    Orders:    Vitamin D 25-Hydroxy,Total (for eval of Vitamin D levels); Future    Screening mammogram for breast cancer    Orders:    BI mammo bilateral screening tomosynthesis; Future    Routine general medical examination at health care facility  Health Maintenance Topics with due status: Overdue       Topic Date Due    DTaP/Tdap/Td Vaccines Never done    RSV High Risk: (Elderly (60+) or Pregnant Population) Never done    COVID-19 Vaccine 09/01/2024     Health Maintenance Topics with due status: Not Due       Topic Last Completion Date    Colorectal Cancer Screening 05/02/2022    Influenza Vaccine 10/05/2023    Mammogram 07/31/2024    Lipid Panel 05/06/2025    Diabetes Screening 05/06/2025    Medicare Annual Wellness Visit (AWV) 05/09/2025     Health Maintenance Topics with due status: Completed       Topic Last Completion Date    Hepatitis C Screening 04/05/2022    Pneumococcal Vaccine 02/20/2023    Zoster Vaccines 04/27/2023    Bone Density Scan 12/16/2024     Health Maintenance Topics with due status: Aged Out       Topic Date Due    HIB Vaccines Aged Out    Hepatitis B Vaccines Aged Out    IPV Vaccines Aged Out    Hepatitis A Vaccines Aged Out    Meningococcal Vaccine Aged Out    Rotavirus Vaccines Aged Out    HPV Vaccines Aged Out     Health Maintenance Topics with due status: Discontinued       Topic Date Due    Welcome to Medicare Visit Discontinued    Irritable Bowel Syndrome Discontinued       Orders:    1 Year Follow Up In Primary Care - Wellness Exam; Future            Patient was identified as a fall risk. Risk prevention instructions provided.

## 2025-05-09 NOTE — ASSESSMENT & PLAN NOTE
Continue supplement.  Normal vitamin D level  Recheck with next major lab panel    Orders:    Vitamin D 25-Hydroxy,Total (for eval of Vitamin D levels); Future

## 2025-05-09 NOTE — ASSESSMENT & PLAN NOTE
LDL at goal.  Continue statin  Orders:    rosuvastatin (Crestor) 10 mg tablet; Take 1 tablet (10 mg) by mouth once daily at bedtime.    Comprehensive Metabolic Panel; Future    Lipid Panel; Future    Hemoglobin A1C; Future    TSH with reflex to Free T4 if abnormal; Future    Vitamin D 25-Hydroxy,Total (for eval of Vitamin D levels); Future    CBC and Auto Differential; Future

## 2025-05-09 NOTE — ASSESSMENT & PLAN NOTE
Health Maintenance Topics with due status: Overdue       Topic Date Due    DTaP/Tdap/Td Vaccines Never done    RSV High Risk: (Elderly (60+) or Pregnant Population) Never done    COVID-19 Vaccine 09/01/2024     Health Maintenance Topics with due status: Not Due       Topic Last Completion Date    Colorectal Cancer Screening 05/02/2022    Influenza Vaccine 10/05/2023    Mammogram 07/31/2024    Lipid Panel 05/06/2025    Diabetes Screening 05/06/2025    Medicare Annual Wellness Visit (AWV) 05/09/2025     Health Maintenance Topics with due status: Completed       Topic Last Completion Date    Hepatitis C Screening 04/05/2022    Pneumococcal Vaccine 02/20/2023    Zoster Vaccines 04/27/2023    Bone Density Scan 12/16/2024     Health Maintenance Topics with due status: Aged Out       Topic Date Due    HIB Vaccines Aged Out    Hepatitis B Vaccines Aged Out    IPV Vaccines Aged Out    Hepatitis A Vaccines Aged Out    Meningococcal Vaccine Aged Out    Rotavirus Vaccines Aged Out    HPV Vaccines Aged Out     Health Maintenance Topics with due status: Discontinued       Topic Date Due    Welcome to Medicare Visit Discontinued    Irritable Bowel Syndrome Discontinued       Orders:    1 Year Follow Up In Primary Care - Wellness Exam; Future

## 2025-05-12 ENCOUNTER — INFUSION (OUTPATIENT)
Dept: INFUSION THERAPY | Facility: HOSPITAL | Age: 70
End: 2025-05-12
Payer: MEDICARE

## 2025-05-12 VITALS
TEMPERATURE: 97.7 F | DIASTOLIC BLOOD PRESSURE: 77 MMHG | OXYGEN SATURATION: 95 % | RESPIRATION RATE: 16 BRPM | HEART RATE: 68 BPM | WEIGHT: 254.85 LBS | BODY MASS INDEX: 45.14 KG/M2 | SYSTOLIC BLOOD PRESSURE: 140 MMHG

## 2025-05-12 DIAGNOSIS — M05.79 RHEUMATOID ARTHRITIS INVOLVING MULTIPLE SITES WITH POSITIVE RHEUMATOID FACTOR (MULTI): Primary | ICD-10-CM

## 2025-05-12 PROCEDURE — 2500000004 HC RX 250 GENERAL PHARMACY W/ HCPCS (ALT 636 FOR OP/ED): Mod: JZ | Performed by: INTERNAL MEDICINE

## 2025-05-12 PROCEDURE — 96365 THER/PROPH/DIAG IV INF INIT: CPT | Mod: INF

## 2025-05-12 RX ADMIN — TOCILIZUMAB 461.8 MG: 20 INJECTION, SOLUTION, CONCENTRATE INTRAVENOUS at 12:57

## 2025-05-12 ASSESSMENT — ENCOUNTER SYMPTOMS
OCCASIONAL FEELINGS OF UNSTEADINESS: 1
DEPRESSION: 0
LOSS OF SENSATION IN FEET: 0

## 2025-05-12 ASSESSMENT — PAIN SCALES - GENERAL: PAINLEVEL_OUTOF10: 0-NO PAIN

## 2025-05-30 ENCOUNTER — TELEPHONE (OUTPATIENT)
Facility: CLINIC | Age: 70
End: 2025-05-30
Payer: MEDICARE

## 2025-05-30 NOTE — TELEPHONE ENCOUNTER
Patient needs to be seen in office prior to Colonoscopy due to BMI.     Scheduled with Dr. Wang for 7.22.2025

## 2025-06-16 ENCOUNTER — INFUSION (OUTPATIENT)
Dept: INFUSION THERAPY | Facility: HOSPITAL | Age: 70
End: 2025-06-16
Payer: MEDICARE

## 2025-06-16 VITALS
OXYGEN SATURATION: 98 % | TEMPERATURE: 97.1 F | SYSTOLIC BLOOD PRESSURE: 139 MMHG | DIASTOLIC BLOOD PRESSURE: 92 MMHG | RESPIRATION RATE: 18 BRPM | HEART RATE: 50 BPM

## 2025-06-16 DIAGNOSIS — M05.79 RHEUMATOID ARTHRITIS INVOLVING MULTIPLE SITES WITH POSITIVE RHEUMATOID FACTOR (MULTI): ICD-10-CM

## 2025-06-16 PROCEDURE — 2500000004 HC RX 250 GENERAL PHARMACY W/ HCPCS (ALT 636 FOR OP/ED): Mod: JZ | Performed by: INTERNAL MEDICINE

## 2025-06-16 PROCEDURE — 96365 THER/PROPH/DIAG IV INF INIT: CPT | Mod: INF

## 2025-06-16 RX ORDER — BISMUTH SUBSALICYLATE 262 MG
1 TABLET,CHEWABLE ORAL DAILY
COMMUNITY

## 2025-06-16 RX ADMIN — TOCILIZUMAB 461.8 MG: 20 INJECTION, SOLUTION, CONCENTRATE INTRAVENOUS at 12:58

## 2025-06-16 ASSESSMENT — ENCOUNTER SYMPTOMS
DEPRESSION: 0
LOSS OF SENSATION IN FEET: 0
OCCASIONAL FEELINGS OF UNSTEADINESS: 0

## 2025-06-16 ASSESSMENT — PAIN SCALES - GENERAL: PAINLEVEL_OUTOF10: 0-NO PAIN

## 2025-07-14 ENCOUNTER — APPOINTMENT (OUTPATIENT)
Dept: INFUSION THERAPY | Facility: HOSPITAL | Age: 70
End: 2025-07-14
Payer: MEDICARE

## 2025-07-16 ENCOUNTER — INFUSION (OUTPATIENT)
Dept: INFUSION THERAPY | Facility: HOSPITAL | Age: 70
End: 2025-07-16
Payer: MEDICARE

## 2025-07-16 VITALS
OXYGEN SATURATION: 94 % | RESPIRATION RATE: 16 BRPM | WEIGHT: 257 LBS | TEMPERATURE: 97.9 F | BODY MASS INDEX: 45.53 KG/M2 | SYSTOLIC BLOOD PRESSURE: 146 MMHG | HEART RATE: 58 BPM | DIASTOLIC BLOOD PRESSURE: 82 MMHG

## 2025-07-16 DIAGNOSIS — M05.79 RHEUMATOID ARTHRITIS INVOLVING MULTIPLE SITES WITH POSITIVE RHEUMATOID FACTOR (MULTI): ICD-10-CM

## 2025-07-16 PROCEDURE — 2500000004 HC RX 250 GENERAL PHARMACY W/ HCPCS (ALT 636 FOR OP/ED): Mod: JZ | Performed by: INTERNAL MEDICINE

## 2025-07-16 PROCEDURE — 96365 THER/PROPH/DIAG IV INF INIT: CPT | Mod: INF

## 2025-07-16 RX ADMIN — TOCILIZUMAB 461.8 MG: 20 INJECTION, SOLUTION, CONCENTRATE INTRAVENOUS at 10:39

## 2025-07-16 ASSESSMENT — PAIN SCALES - GENERAL: PAINLEVEL_OUTOF10: 0-NO PAIN

## 2025-07-16 ASSESSMENT — ENCOUNTER SYMPTOMS
DEPRESSION: 1
OCCASIONAL FEELINGS OF UNSTEADINESS: 0
LOSS OF SENSATION IN FEET: 1

## 2025-07-22 ENCOUNTER — APPOINTMENT (OUTPATIENT)
Facility: CLINIC | Age: 70
End: 2025-07-22
Payer: MEDICARE

## 2025-07-22 VITALS
HEIGHT: 63 IN | SYSTOLIC BLOOD PRESSURE: 133 MMHG | DIASTOLIC BLOOD PRESSURE: 79 MMHG | BODY MASS INDEX: 45.39 KG/M2 | WEIGHT: 256.2 LBS | HEART RATE: 54 BPM | OXYGEN SATURATION: 94 %

## 2025-07-22 DIAGNOSIS — Z12.11 SCREENING FOR COLON CANCER: Primary | ICD-10-CM

## 2025-07-22 PROBLEM — Z85.42 HISTORY OF MALIGNANT NEOPLASM OF UTERINE BODY: Status: ACTIVE | Noted: 2025-07-22

## 2025-07-22 PROCEDURE — 3075F SYST BP GE 130 - 139MM HG: CPT | Performed by: INTERNAL MEDICINE

## 2025-07-22 PROCEDURE — 1036F TOBACCO NON-USER: CPT | Performed by: INTERNAL MEDICINE

## 2025-07-22 PROCEDURE — 3078F DIAST BP <80 MM HG: CPT | Performed by: INTERNAL MEDICINE

## 2025-07-22 PROCEDURE — 1159F MED LIST DOCD IN RCRD: CPT | Performed by: INTERNAL MEDICINE

## 2025-07-22 PROCEDURE — 99213 OFFICE O/P EST LOW 20 MIN: CPT | Performed by: INTERNAL MEDICINE

## 2025-07-22 PROCEDURE — 1160F RVW MEDS BY RX/DR IN RCRD: CPT | Performed by: INTERNAL MEDICINE

## 2025-07-22 PROCEDURE — 3008F BODY MASS INDEX DOCD: CPT | Performed by: INTERNAL MEDICINE

## 2025-07-22 ASSESSMENT — ENCOUNTER SYMPTOMS
ARTHRALGIAS: 0
FATIGUE: 0
FEVER: 0
PALPITATIONS: 0
SHORTNESS OF BREATH: 0
NUMBNESS: 0
HEADACHES: 0
WHEEZING: 0
DYSURIA: 0
TROUBLE SWALLOWING: 0
SEIZURES: 0
NERVOUS/ANXIOUS: 0
CHILLS: 0
DIZZINESS: 0
MYALGIAS: 0
SORE THROAT: 0
ROS GI COMMENTS: AS DETAILED ABOVE.
COUGH: 0
UNEXPECTED WEIGHT CHANGE: 0
HEMATURIA: 0
WEAKNESS: 0
ADENOPATHY: 0
CONFUSION: 0
BRUISES/BLEEDS EASILY: 0
VOICE CHANGE: 0

## 2025-07-22 NOTE — PATIENT INSTRUCTIONS
You have been scheduled for a colonoscopy.  You were given instructions for preparing for this test in the office today.  If you have questions about these instructions, please call my office at [phone].    After your procedure, you can expect me to talk to you to go over the results of the procedure. If polyps were removed I will usually be able to tell you my initial thoughts about those polyps and give you some idea of when you should have another colonoscopy.    If any polyps are removed during your procedure or if any biopsies are obtained those specimens will go to the pathologists to review under the microscope. Once those results are available they will be sent to me electronically to review. These results will also be available to you at that time through [facility]. I briefly review all of these results by the next business day to determine if there is any urgent information that needs to be communicated to you right away or anything that would significantly change what I told you at the time of the procedure. If there is nothing urgent this is usually a good sign and I will then do a more extensive review of the result and send you a letter with my final recommendations within a week of the result becoming available. If you have questions or need additional information I urge you to call the office at [phone], but I do ask for patience as I spend a good portion of each day performing procedures like the one you are scheduled for and during those times I am not able to take or return routine phone calls.    You were also given information regarding the schedule for your procedure including the time that you need to arrive to the endoscopy unit.  You will also be contacted 2-3 day prior to your procedure to confirm the final arrival time.  If you have questions about this or if you need to cancel or change this appointment please call my office at [phone].       Follow up as needed.

## 2025-07-22 NOTE — PROGRESS NOTES
[facility]    ASSESSMENT and PLAN:       Penny Rosa is a 70 y.o. female with a significant past medical history of HTN, obesity (BMI 45), endometrial cancer, RA, HLD, OA, hyperparathyroidism, osteopenia, and depression who presents for consultation requested by her primary care provider (ZAIDA Levine, BRANDY) for a discussion regarding colonoscopy.       Screening for colon cancer and history of colon polyps  Adenomatous polyps on previous colonoscopy. Due for screening/surveillance colonoscopy.  No significant GI symptoms at this time.  No previous difficulties with sedation.  No antiplatelet/antithrombotic use.  - colonoscopy scheduled in OR due to BMI  - OTC bowel prep discussed with patient  - instructions for procedure discussed with patient in the office today and hard copy given to patient today      Follow up with GI as needed.            Eduardo Wang MD        Senior Attending Physician, Gastroenterology    [facility]    Clinical   [employer] [facility] [employer]        Subjective   HISTORY OF PRESENT ILLNESS:     Chief Complaint  Colon Cancer Screening    History Of Present Illness:    Penny Rosa is a 70 y.o. female with a significant past medical history of HTN, obesity (BMI 45), endometrial cancer, RA, HLD, OA, hyperparathyroidism, osteopenia, and depression who presents for consultation requested by her primary care provider (ZAIDA Levine, BRANDY) for a discussion regarding colonoscopy.     She had previously seen GI (Greogria Urias PA-c) in 2021 for evaluation of chronic diarrhea. At that time labs were ordered, but not done. In the past, C diff was negative and CRP/ESR has been normal.      She says that she has been doing okay. She has noticed some urgency and soft BMs when she eats out. She has noticed that spicy foods are harder to deal with,  but hasn't noticed any other specific food triggers. No other GI symptoms.       Patient denies any heartburn/GERD, N/V, dysphagia, odynophagia, abdominal pain, diarrhea, constipation, hematemesis, hematochezia, melena, or weight loss.    Endoscopy History:  2/14/2020 - Colonoscopy: normal TI, 17 mm sigmoid polyp (TVA on path), 30 mm rectal polyp removed piecemeal (TVA on path), otherwise normal colon, random biopsies obtained (focal active colitis on path)  9/21/2020 - Colonoscopy: normal TI, post-polypectomy scar with healthy mucosa and no residual polyp, rectum polyp (TA on path), random biopsies obtained (normal mucosa on path)  5/2/2022 - Colonoscopy: post-polypectomy scar with healthy mucosa and no residual polyp, otherwise normal mucosa      Review of systems:   Review of Systems   Constitutional:  Negative for chills, fatigue, fever and unexpected weight change.   HENT:  Negative for congestion, sneezing, sore throat, trouble swallowing and voice change.    Respiratory:  Negative for cough, shortness of breath and wheezing.    Cardiovascular:  Negative for chest pain, palpitations and leg swelling.   Gastrointestinal:         As detailed above.   Genitourinary:  Negative for dysuria and hematuria.   Musculoskeletal:  Negative for arthralgias and myalgias.   Skin:  Negative for pallor and rash.   Neurological:  Negative for dizziness, seizures, syncope, weakness, numbness and headaches.   Hematological:  Negative for adenopathy. Does not bruise/bleed easily.   Psychiatric/Behavioral:  Negative for confusion. The patient is not nervous/anxious.    All other systems reviewed and are negative.      I performed a complete 10 point review of systems and it is negative except as noted in HPI or above.      PAST HISTORIES:       Past Medical History:  Problem List[1]  She has a past medical history of Anxiety, Hypercholesteremia, Hypertension, Personal history of malignant neoplasm of other parts of uterus, Personal  "history of other diseases of the circulatory system, Personal history of other diseases of the musculoskeletal system and connective tissue, Personal history of other diseases of the musculoskeletal system and connective tissue, Personal history of other specified conditions, Unspecified osteoarthritis, unspecified site, and Uterine cancer (Multi).    Past Surgical History:  She has a past surgical history that includes Other surgical history (10/23/2019); Other surgical history (10/23/2019); Other surgical history (10/23/2019); Other surgical history (10/23/2019); Other surgical history (10/23/2019); Other surgical history (02/17/2020); Hysterectomy; and Colonoscopy.      Social History:  She reports that she has never smoked. She has never used smokeless tobacco. She reports that she does not drink alcohol and does not use drugs.    Family History:    Family History[2]     Allergies:  Patient has no known allergies.      Objective   OBJECTIVE:       Last Recorded Vitals:  Vitals:    07/22/25 1515   BP: 133/79   BP Location: Left arm   Patient Position: Sitting   BP Cuff Size: Large adult   Pulse: 54   SpO2: 94%   Weight: 116 kg (256 lb 3.2 oz)   Height: 1.6 m (5' 3\")     /79 (BP Location: Left arm, Patient Position: Sitting, BP Cuff Size: Large adult)   Pulse 54   Ht 1.6 m (5' 3\")   Wt 116 kg (256 lb 3.2 oz)   SpO2 94%   BMI 45.38 kg/m²      Physical Exam:    Physical Exam  Vitals reviewed.   Constitutional:       General: She is not in acute distress.     Appearance: She is obese. She is not ill-appearing.   HENT:      Head: Normocephalic and atraumatic.     Eyes:      General: No scleral icterus.      Cardiovascular:      Rate and Rhythm: Normal rate and regular rhythm.      Pulses: Normal pulses.      Heart sounds: Normal heart sounds. No murmur heard.  Pulmonary:      Effort: Pulmonary effort is normal. No respiratory distress.      Breath sounds: Normal breath sounds. No wheezing.   Abdominal:      " General: Bowel sounds are normal.      Palpations: Abdomen is soft.      Tenderness: There is no abdominal tenderness. There is no rebound.     Musculoskeletal:         General: No swelling or deformity.     Skin:     General: Skin is warm and dry.      Coloration: Skin is not jaundiced.      Findings: No rash.     Neurological:      General: No focal deficit present.      Mental Status: She is alert and oriented to person, place, and time.     Psychiatric:         Mood and Affect: Mood normal.         Behavior: Behavior normal.         Thought Content: Thought content normal.         Judgment: Judgment normal.         Home Medications:  Prior to Admission medications   Medication Sig Start Date End Date Taking? Authorizing Provider   citalopram (CeleXA) 40 mg tablet Take 1 tablet (40 mg) by mouth once daily. 5/9/25 11/5/25  ZAIDA Levine DNP   lisinopriL-hydrochlorothiazide 20-25 mg tablet Take 1 tablet by mouth once daily. 5/9/25 5/9/26  ZAIDA Levine DNP   metoprolol tartrate (Lopressor) 25 mg tablet Take 1 tablet (25 mg) by mouth once daily. 5/9/25   ZAIDA Levine DNP   multivitamin tablet Take 1 tablet by mouth once daily.    Historical Provider, MD   naproxen sodium (Aleve) 220 mg tablet Take 1 tablet (220 mg) by mouth twice a day. 9/28/12   Historical Provider, MD   rosuvastatin (Crestor) 10 mg tablet Take 1 tablet (10 mg) by mouth once daily at bedtime. 5/9/25   ZAIDA Levine DNP   solifenacin (VESIcare) 10 mg tablet Take 1 tablet (10 mg) by mouth once daily. Swallow tablet whole; do not crush, chew, or split. 4/30/25 10/27/25  Neri Peng MD   tocilizumab (Actemra) 400 mg/20 mL (20 mg/mL) solution injection Infuse 22.6 mL (452 mg) into a venous catheter every 28 (twenty-eight) days. Every 4 weeks 8/28/24   Jerel Kolb MD   vibegron (Gemtesa) 75 mg tablet Take 1 tablet (75 mg) by mouth once daily at bedtime.  Patient not taking: Reported on 7/16/2025 10/29/24 7/16/25   Daysi Blackwell PA-C   VITAMIN B COMPLEX ORAL Take 1 tablet by mouth once daily.  Patient not taking: Reported on 7/16/2025 9/28/12 7/16/25  Historical Provider, MD         Relevant Results Recent labs reviewed in the EMR.    Lab Results   Component Value Date/Time    WBC 5.4 05/06/2025 0808    HGB 13.5 05/06/2025 0808    HGB 13.0 01/13/2025 1312    HGB 14.0 10/08/2024 1001    HGB 14.3 07/16/2024 0842    MCV 92.7 05/06/2025 0808     05/06/2025 0808     01/13/2025 1312     10/08/2024 1001     07/16/2024 0842    [id number] 533 01/13/2025 1312       Lab Results   Component Value Date/Time     05/06/2025 0808    K 4.0 05/06/2025 0808     05/06/2025 0808    BUN 20 05/06/2025 0808    CREATININE 0.88 05/06/2025 0808    CREATININE 0.93 01/13/2025 1312    CREATININE 0.80 07/16/2024 0842       Lab Results   Component Value Date/Time    BILITOT 0.6 05/06/2025 0808    BILITOT 0.6 01/13/2025 1312    BILITOT 0.6 07/16/2024 0842    BILITOT 0.5 05/17/2024 1457    BILIDIR 0.1 08/30/2023 1119    ALKPHOS 98 05/06/2025 0808    ALKPHOS 52 01/13/2025 1312    ALKPHOS 57 07/16/2024 0842    ALKPHOS 79 05/17/2024 1457    AST 20 05/06/2025 0808    AST 20 01/13/2025 1312    AST 26 10/08/2024 1001    AST 28 07/16/2024 0842    ALT 26 05/06/2025 0808    ALT 25 01/13/2025 1312    ALT 31 10/08/2024 1001    ALT 36 07/16/2024 0842       Lab Results   Component Value Date/Time    CRP <0.10 01/13/2025 1312    CRP <0.10 10/08/2024 1001    CRP 0.13 05/17/2024 9377       Radiology: Imaging reviewed in the EMR.      === 04/23/20 ===    CT CHEST PULMONARY EMBOLISM W IV CONTRAST    - Impression -  1. No evidence of pulmonary embolism.  2. Mild dependent atelectasis bilaterally. No localized infiltrate or  consolidation.             [1]   Patient Active Problem List  Diagnosis    Acute bronchitis    Adenomatous polyps    Ankle swelling    Arthritis of left elbow    Chronic diarrhea    Depression    Essential  hypertension    Frequency-urgency syndrome    Overactive bladder    Hypercalcemia    Injury of left knee    Injury of toe on right foot    Lipoma of right lower extremity    Lumbago    Mixed hyperlipidemia    Nocturia    Osteopenia of multiple sites    Primary hyperparathyroidism (Multi)    Recurrent major depressive disorder, in full remission    Shoulder arthritis    Right foot strain, initial encounter    Right shoulder pain    Shoulder impingement, right    Sore throat    Status post total replacement of right shoulder    Strain of left knee and leg    Tonsillitis    Tubular adenoma of colon    Yeast infection of the skin    Bilateral hearing loss due to cerumen impaction    B12 deficiency    Impaired fasting blood sugar    Vitamin D deficiency    Endometrial cancer (Multi)    H/O: hysterectomy    Urge incontinence    Primary localized osteoarthritis of left pelvic region and thigh    Morbid obesity with BMI of 40.0-44.9, adult (Multi)    Rheumatoid arthritis involving multiple sites with positive rheumatoid factor (Multi)    Pharyngitis    Laryngitis acute, spasmodic    Moderate episode of recurrent major depressive disorder    Screening mammogram for breast cancer    Routine general medical examination at health care facility    History of malignant neoplasm of uterine body   [2]   Family History  Problem Relation Name Age of Onset    Hypertension Father      Cancer Father      Diabetes type II Father

## 2025-08-08 ENCOUNTER — HOSPITAL ENCOUNTER (OUTPATIENT)
Dept: RADIOLOGY | Facility: HOSPITAL | Age: 70
Discharge: HOME | End: 2025-08-08
Payer: MEDICARE

## 2025-08-08 ENCOUNTER — APPOINTMENT (OUTPATIENT)
Facility: CLINIC | Age: 70
End: 2025-08-08
Payer: MEDICARE

## 2025-08-08 VITALS — HEIGHT: 63 IN | BODY MASS INDEX: 45.36 KG/M2 | WEIGHT: 256 LBS

## 2025-08-08 DIAGNOSIS — Z12.31 SCREENING MAMMOGRAM FOR BREAST CANCER: ICD-10-CM

## 2025-08-08 PROCEDURE — 77063 BREAST TOMOSYNTHESIS BI: CPT

## 2025-08-08 PROCEDURE — 77067 SCR MAMMO BI INCL CAD: CPT

## 2025-08-13 ENCOUNTER — APPOINTMENT (OUTPATIENT)
Dept: INFUSION THERAPY | Facility: HOSPITAL | Age: 70
End: 2025-08-13
Payer: MEDICARE

## 2025-08-15 ENCOUNTER — INFUSION (OUTPATIENT)
Dept: INFUSION THERAPY | Facility: HOSPITAL | Age: 70
End: 2025-08-15
Payer: MEDICARE

## 2025-08-15 VITALS
DIASTOLIC BLOOD PRESSURE: 77 MMHG | RESPIRATION RATE: 18 BRPM | HEART RATE: 75 BPM | OXYGEN SATURATION: 96 % | TEMPERATURE: 96.6 F | SYSTOLIC BLOOD PRESSURE: 150 MMHG

## 2025-08-15 DIAGNOSIS — M05.79 RHEUMATOID ARTHRITIS INVOLVING MULTIPLE SITES WITH POSITIVE RHEUMATOID FACTOR (MULTI): ICD-10-CM

## 2025-08-15 PROCEDURE — 2500000004 HC RX 250 GENERAL PHARMACY W/ HCPCS (ALT 636 FOR OP/ED): Performed by: INTERNAL MEDICINE

## 2025-08-15 PROCEDURE — 96365 THER/PROPH/DIAG IV INF INIT: CPT | Mod: INF

## 2025-08-15 RX ADMIN — TOCILIZUMAB 461.8 MG: 20 INJECTION, SOLUTION, CONCENTRATE INTRAVENOUS at 10:23

## 2025-08-15 ASSESSMENT — ENCOUNTER SYMPTOMS
LOSS OF SENSATION IN FEET: 0
OCCASIONAL FEELINGS OF UNSTEADINESS: 0
DEPRESSION: 0

## 2025-08-21 ENCOUNTER — PRE-ADMISSION TESTING (OUTPATIENT)
Dept: PREADMISSION TESTING | Facility: HOSPITAL | Age: 70
End: 2025-08-21
Payer: MEDICARE

## 2025-08-21 DIAGNOSIS — Z12.11 SCREENING FOR MALIGNANT NEOPLASM OF COLON: ICD-10-CM

## 2025-08-21 DIAGNOSIS — Z01.818 PRE-OP EVALUATION: Primary | ICD-10-CM

## 2025-08-21 DIAGNOSIS — I10 ESSENTIAL HYPERTENSION: ICD-10-CM

## 2025-08-21 DIAGNOSIS — E66.01 MORBID OBESITY WITH BMI OF 40.0-44.9, ADULT (MULTI): ICD-10-CM

## 2025-08-26 ENCOUNTER — PRE-ADMISSION TESTING (OUTPATIENT)
Dept: PREADMISSION TESTING | Facility: HOSPITAL | Age: 70
End: 2025-08-26
Payer: MEDICARE

## 2025-08-26 VITALS
SYSTOLIC BLOOD PRESSURE: 121 MMHG | TEMPERATURE: 97.1 F | HEIGHT: 63 IN | OXYGEN SATURATION: 95 % | DIASTOLIC BLOOD PRESSURE: 84 MMHG | BODY MASS INDEX: 45.93 KG/M2 | RESPIRATION RATE: 20 BRPM | WEIGHT: 259.2 LBS | HEART RATE: 74 BPM

## 2025-08-26 DIAGNOSIS — Z01.818 PRE-OP EVALUATION: Primary | ICD-10-CM

## 2025-08-26 DIAGNOSIS — I10 ESSENTIAL HYPERTENSION: ICD-10-CM

## 2025-08-26 DIAGNOSIS — E66.01 MORBID OBESITY WITH BMI OF 40.0-44.9, ADULT (MULTI): ICD-10-CM

## 2025-08-26 DIAGNOSIS — Z12.11 SCREENING FOR MALIGNANT NEOPLASM OF COLON: ICD-10-CM

## 2025-08-26 LAB
ANION GAP SERPL CALC-SCNC: 10 MMOL/L (ref 10–20)
BUN SERPL-MCNC: 17 MG/DL (ref 6–23)
CALCIUM SERPL-MCNC: 9.6 MG/DL (ref 8.6–10.3)
CHLORIDE SERPL-SCNC: 104 MMOL/L (ref 98–107)
CO2 SERPL-SCNC: 28 MMOL/L (ref 21–32)
CREAT SERPL-MCNC: 0.89 MG/DL (ref 0.5–1.05)
EGFRCR SERPLBLD CKD-EPI 2021: 70 ML/MIN/1.73M*2
ERYTHROCYTE [DISTWIDTH] IN BLOOD BY AUTOMATED COUNT: 15.4 % (ref 11.5–14.5)
GLUCOSE SERPL-MCNC: 101 MG/DL (ref 74–99)
HCT VFR BLD AUTO: 41.7 % (ref 36–46)
HGB BLD-MCNC: 13.5 G/DL (ref 12–16)
MCH RBC QN AUTO: 29.8 PG (ref 26–34)
MCHC RBC AUTO-ENTMCNC: 32.4 G/DL (ref 32–36)
MCV RBC AUTO: 92 FL (ref 80–100)
NRBC BLD-RTO: 0 /100 WBCS (ref 0–0)
PLATELET # BLD AUTO: 216 X10*3/UL (ref 150–450)
POTASSIUM SERPL-SCNC: 3.8 MMOL/L (ref 3.5–5.3)
RBC # BLD AUTO: 4.53 X10*6/UL (ref 4–5.2)
SODIUM SERPL-SCNC: 138 MMOL/L (ref 136–145)
WBC # BLD AUTO: 4.6 X10*3/UL (ref 4.4–11.3)

## 2025-08-26 PROCEDURE — 80048 BASIC METABOLIC PNL TOTAL CA: CPT

## 2025-08-26 PROCEDURE — 93010 ELECTROCARDIOGRAM REPORT: CPT | Performed by: INTERNAL MEDICINE

## 2025-08-26 PROCEDURE — 85027 COMPLETE CBC AUTOMATED: CPT

## 2025-08-26 PROCEDURE — 36415 COLL VENOUS BLD VENIPUNCTURE: CPT

## 2025-08-26 PROCEDURE — 93005 ELECTROCARDIOGRAM TRACING: CPT

## 2025-08-26 ASSESSMENT — DUKE ACTIVITY SCORE INDEX (DASI)
CAN YOU CLIMB A FLIGHT OF STAIRS OR WALK UP A HILL: YES
CAN YOU DO LIGHT WORK AROUND THE HOUSE LIKE DUSTING OR WASHING DISHES: YES
CAN YOU WALK A BLOCK OR TWO ON LEVEL GROUND: NO
CAN YOU TAKE CARE OF YOURSELF (EAT, DRESS, BATHE, OR USE TOILET): YES
CAN YOU DO MODERATE WORK AROUND THE HOUSE LIKE VACUUMING, SWEEPING FLOORS OR CARRYING GROCERIES: YES
CAN YOU PARTICIPATE IN STRENOUS SPORTS LIKE SWIMMING, SINGLES TENNIS, FOOTBALL, BASKETBALL, OR SKIING: NO
CAN YOU DO HEAVY WORK AROUND THE HOUSE LIKE SCRUBBING FLOORS OR LIFTING AND MOVING HEAVY FURNITURE: NO
CAN YOU RUN A SHORT DISTANCE: NO
CAN YOU DO YARD WORK LIKE RAKING LEAVES, WEEDING OR PUSHING A MOWER: YES
CAN YOU WALK INDOORS, SUCH AS AROUND YOUR HOUSE: YES

## 2025-08-26 ASSESSMENT — LIFESTYLE VARIABLES: SMOKING_STATUS: NONSMOKER

## 2025-08-26 ASSESSMENT — ENCOUNTER SYMPTOMS
CONSTITUTIONAL NEGATIVE: 1
PSYCHIATRIC NEGATIVE: 1
EYES NEGATIVE: 1
ENDOCRINE NEGATIVE: 1
CARDIOVASCULAR NEGATIVE: 1
MUSCULOSKELETAL NEGATIVE: 1
NEUROLOGICAL NEGATIVE: 1
RESPIRATORY NEGATIVE: 1
HEMATOLOGIC/LYMPHATIC NEGATIVE: 1
GASTROINTESTINAL NEGATIVE: 1
ALLERGIC/IMMUNOLOGIC NEGATIVE: 1

## 2025-08-27 ENCOUNTER — ANESTHESIA EVENT (OUTPATIENT)
Dept: OPERATING ROOM | Facility: HOSPITAL | Age: 70
End: 2025-08-27
Payer: MEDICARE

## 2025-08-28 LAB
ATRIAL RATE: 63 BPM
P AXIS: 39 DEGREES
PR INTERVAL: 188 MS
Q ONSET: 252 MS
QRS COUNT: 10 BEATS
QRS DURATION: 107 MS
QT INTERVAL: 405 MS
QTC CALCULATION(BAZETT): 412 MS
QTC FREDERICIA: 409 MS
R AXIS: 5 DEGREES
T AXIS: 10 DEGREES
T OFFSET: 455 MS
VENTRICULAR RATE: 62 BPM

## 2025-08-29 ENCOUNTER — ANESTHESIA (OUTPATIENT)
Dept: OPERATING ROOM | Facility: HOSPITAL | Age: 70
End: 2025-08-29
Payer: MEDICARE

## 2025-08-29 ENCOUNTER — HOSPITAL ENCOUNTER (OUTPATIENT)
Dept: OPERATING ROOM | Facility: HOSPITAL | Age: 70
Discharge: HOME | End: 2025-08-29
Payer: MEDICARE

## 2025-08-29 VITALS
DIASTOLIC BLOOD PRESSURE: 66 MMHG | TEMPERATURE: 97.6 F | RESPIRATION RATE: 12 BRPM | HEIGHT: 63 IN | HEART RATE: 54 BPM | SYSTOLIC BLOOD PRESSURE: 116 MMHG | WEIGHT: 256 LBS | BODY MASS INDEX: 45.36 KG/M2 | OXYGEN SATURATION: 99 %

## 2025-08-29 DIAGNOSIS — Z86.0100 HISTORY OF COLON POLYPS: ICD-10-CM

## 2025-08-29 PROCEDURE — 2500000004 HC RX 250 GENERAL PHARMACY W/ HCPCS (ALT 636 FOR OP/ED): Performed by: REGISTERED NURSE

## 2025-08-29 PROCEDURE — 3700000001 HC GENERAL ANESTHESIA TIME - INITIAL BASE CHARGE: Performed by: REGISTERED NURSE

## 2025-08-29 PROCEDURE — 3600000007 HC OR TIME - EACH INCREMENTAL 1 MINUTE - PROCEDURE LEVEL TWO: Performed by: REGISTERED NURSE

## 2025-08-29 PROCEDURE — 2500000001 HC RX 250 WO HCPCS SELF ADMINISTERED DRUGS (ALT 637 FOR MEDICARE OP): Performed by: ANESTHESIOLOGY

## 2025-08-29 PROCEDURE — 3600000002 HC OR TIME - INITIAL BASE CHARGE - PROCEDURE LEVEL TWO: Performed by: REGISTERED NURSE

## 2025-08-29 PROCEDURE — 2500000004 HC RX 250 GENERAL PHARMACY W/ HCPCS (ALT 636 FOR OP/ED): Performed by: ANESTHESIOLOGY

## 2025-08-29 PROCEDURE — 7100000009 HC PHASE TWO TIME - INITIAL BASE CHARGE: Performed by: REGISTERED NURSE

## 2025-08-29 PROCEDURE — 3700000002 HC GENERAL ANESTHESIA TIME - EACH INCREMENTAL 1 MINUTE: Performed by: REGISTERED NURSE

## 2025-08-29 PROCEDURE — 7100000010 HC PHASE TWO TIME - EACH INCREMENTAL 1 MINUTE: Performed by: REGISTERED NURSE

## 2025-08-29 RX ORDER — ALBUTEROL SULFATE 0.83 MG/ML
2.5 SOLUTION RESPIRATORY (INHALATION) ONCE
Status: DISCONTINUED | OUTPATIENT
Start: 2025-08-29 | End: 2025-08-29 | Stop reason: HOSPADM

## 2025-08-29 RX ORDER — PROPOFOL 10 MG/ML
INJECTION, EMULSION INTRAVENOUS AS NEEDED
Status: DISCONTINUED | OUTPATIENT
Start: 2025-08-29 | End: 2025-08-29

## 2025-08-29 RX ORDER — METOCLOPRAMIDE HYDROCHLORIDE 5 MG/ML
10 INJECTION INTRAMUSCULAR; INTRAVENOUS ONCE
Status: COMPLETED | OUTPATIENT
Start: 2025-08-29 | End: 2025-08-29

## 2025-08-29 RX ORDER — SODIUM CITRATE AND CITRIC ACID MONOHYDRATE 334; 500 MG/5ML; MG/5ML
30 SOLUTION ORAL ONCE
Status: COMPLETED | OUTPATIENT
Start: 2025-08-29 | End: 2025-08-29

## 2025-08-29 RX ORDER — SODIUM CHLORIDE, SODIUM LACTATE, POTASSIUM CHLORIDE, CALCIUM CHLORIDE 600; 310; 30; 20 MG/100ML; MG/100ML; MG/100ML; MG/100ML
125 INJECTION, SOLUTION INTRAVENOUS CONTINUOUS
Status: DISCONTINUED | OUTPATIENT
Start: 2025-08-29 | End: 2025-08-30 | Stop reason: HOSPADM

## 2025-08-29 RX ORDER — LIDOCAINE HCL/PF 100 MG/5ML
SYRINGE (ML) INTRAVENOUS AS NEEDED
Status: DISCONTINUED | OUTPATIENT
Start: 2025-08-29 | End: 2025-08-29

## 2025-08-29 RX ORDER — FAMOTIDINE 10 MG/ML
20 INJECTION, SOLUTION INTRAVENOUS ONCE
Status: COMPLETED | OUTPATIENT
Start: 2025-08-29 | End: 2025-08-29

## 2025-08-29 RX ADMIN — FAMOTIDINE 20 MG: 10 INJECTION, SOLUTION INTRAVENOUS at 08:41

## 2025-08-29 RX ADMIN — SODIUM CITRATE AND CITRIC ACID MONOHYDRATE 30 ML: 500; 334 SOLUTION ORAL at 08:41

## 2025-08-29 RX ADMIN — METOCLOPRAMIDE 10 MG: 5 INJECTION, SOLUTION INTRAMUSCULAR; INTRAVENOUS at 08:41

## 2025-08-29 RX ADMIN — PROPOFOL 20 MG: 10 INJECTION, EMULSION INTRAVENOUS at 09:05

## 2025-08-29 RX ADMIN — PROPOFOL 20 MG: 10 INJECTION, EMULSION INTRAVENOUS at 09:09

## 2025-08-29 RX ADMIN — PROPOFOL 20 MG: 10 INJECTION, EMULSION INTRAVENOUS at 09:12

## 2025-08-29 RX ADMIN — PROPOFOL 10 MG: 10 INJECTION, EMULSION INTRAVENOUS at 09:14

## 2025-08-29 RX ADMIN — PROPOFOL 10 MG: 10 INJECTION, EMULSION INTRAVENOUS at 09:15

## 2025-08-29 RX ADMIN — LIDOCAINE HYDROCHLORIDE 100 MG: 20 INJECTION, SOLUTION INTRAVENOUS at 09:03

## 2025-08-29 RX ADMIN — PROPOFOL 50 MG: 10 INJECTION, EMULSION INTRAVENOUS at 09:03

## 2025-08-29 RX ADMIN — SODIUM CHLORIDE, SODIUM LACTATE, POTASSIUM CHLORIDE, AND CALCIUM CHLORIDE 125 ML/HR: .6; .31; .03; .02 INJECTION, SOLUTION INTRAVENOUS at 08:40

## 2025-08-29 RX ADMIN — PROPOFOL 20 MG: 10 INJECTION, EMULSION INTRAVENOUS at 09:07

## 2025-08-29 ASSESSMENT — PAIN SCALES - GENERAL
PAIN_LEVEL: 0
PAINLEVEL_OUTOF10: 0 - NO PAIN

## 2025-08-29 ASSESSMENT — PAIN - FUNCTIONAL ASSESSMENT
PAIN_FUNCTIONAL_ASSESSMENT: 0-10
PAIN_FUNCTIONAL_ASSESSMENT: 0-10

## 2025-09-05 LAB
LABORATORY COMMENT REPORT: NORMAL
PATH REPORT.FINAL DX SPEC: NORMAL
PATH REPORT.GROSS SPEC: NORMAL
PATH REPORT.RELEVANT HX SPEC: NORMAL
PATH REPORT.TOTAL CANCER: NORMAL

## 2025-09-12 ENCOUNTER — APPOINTMENT (OUTPATIENT)
Dept: INFUSION THERAPY | Facility: HOSPITAL | Age: 70
End: 2025-09-12
Payer: MEDICARE

## 2025-10-31 ENCOUNTER — APPOINTMENT (OUTPATIENT)
Dept: PRIMARY CARE | Facility: CLINIC | Age: 70
End: 2025-10-31
Payer: MEDICARE